# Patient Record
Sex: MALE | Race: WHITE | NOT HISPANIC OR LATINO | Employment: FULL TIME | URBAN - METROPOLITAN AREA
[De-identification: names, ages, dates, MRNs, and addresses within clinical notes are randomized per-mention and may not be internally consistent; named-entity substitution may affect disease eponyms.]

---

## 2019-08-01 ENCOUNTER — ANCILLARY PROCEDURE (OUTPATIENT)
Dept: GENERAL RADIOLOGY | Facility: CLINIC | Age: 59
End: 2019-08-01
Attending: FAMILY MEDICINE
Payer: COMMERCIAL

## 2019-08-01 ENCOUNTER — TELEPHONE (OUTPATIENT)
Dept: FAMILY MEDICINE | Facility: CLINIC | Age: 59
End: 2019-08-01

## 2019-08-01 ENCOUNTER — OFFICE VISIT (OUTPATIENT)
Dept: FAMILY MEDICINE | Facility: CLINIC | Age: 59
End: 2019-08-01
Payer: COMMERCIAL

## 2019-08-01 VITALS
BODY MASS INDEX: 32.62 KG/M2 | OXYGEN SATURATION: 97 % | DIASTOLIC BLOOD PRESSURE: 80 MMHG | SYSTOLIC BLOOD PRESSURE: 125 MMHG | TEMPERATURE: 98.1 F | HEART RATE: 76 BPM | WEIGHT: 203 LBS | HEIGHT: 66 IN

## 2019-08-01 DIAGNOSIS — J90 PLEURAL EFFUSION: Primary | ICD-10-CM

## 2019-08-01 DIAGNOSIS — R10.84 ABDOMINAL PAIN, GENERALIZED: Primary | ICD-10-CM

## 2019-08-01 LAB
BASOPHILS # BLD AUTO: 0 10E9/L (ref 0–0.2)
BASOPHILS NFR BLD AUTO: 0.2 %
DIFFERENTIAL METHOD BLD: NORMAL
EOSINOPHIL # BLD AUTO: 0.3 10E9/L (ref 0–0.7)
EOSINOPHIL NFR BLD AUTO: 2.7 %
ERYTHROCYTE [DISTWIDTH] IN BLOOD BY AUTOMATED COUNT: 14.1 % (ref 10–15)
ERYTHROCYTE [SEDIMENTATION RATE] IN BLOOD BY WESTERGREN METHOD: 9 MM/H (ref 0–20)
HCT VFR BLD AUTO: 45.1 % (ref 40–53)
HGB BLD-MCNC: 14.8 G/DL (ref 13.3–17.7)
LYMPHOCYTES # BLD AUTO: 2.1 10E9/L (ref 0.8–5.3)
LYMPHOCYTES NFR BLD AUTO: 20.6 %
MCH RBC QN AUTO: 28.4 PG (ref 26.5–33)
MCHC RBC AUTO-ENTMCNC: 32.8 G/DL (ref 31.5–36.5)
MCV RBC AUTO: 86 FL (ref 78–100)
MONOCYTES # BLD AUTO: 0.7 10E9/L (ref 0–1.3)
MONOCYTES NFR BLD AUTO: 6.9 %
NEUTROPHILS # BLD AUTO: 7.1 10E9/L (ref 1.6–8.3)
NEUTROPHILS NFR BLD AUTO: 69.6 %
PLATELET # BLD AUTO: 262 10E9/L (ref 150–450)
RBC # BLD AUTO: 5.22 10E12/L (ref 4.4–5.9)
WBC # BLD AUTO: 10.2 10E9/L (ref 4–11)

## 2019-08-01 PROCEDURE — 36415 COLL VENOUS BLD VENIPUNCTURE: CPT | Performed by: FAMILY MEDICINE

## 2019-08-01 PROCEDURE — 74019 RADEX ABDOMEN 2 VIEWS: CPT

## 2019-08-01 PROCEDURE — 99214 OFFICE O/P EST MOD 30 MIN: CPT | Performed by: FAMILY MEDICINE

## 2019-08-01 PROCEDURE — 85652 RBC SED RATE AUTOMATED: CPT | Performed by: FAMILY MEDICINE

## 2019-08-01 PROCEDURE — 85025 COMPLETE CBC W/AUTO DIFF WBC: CPT | Performed by: FAMILY MEDICINE

## 2019-08-01 RX ORDER — LISINOPRIL 5 MG/1
5 TABLET ORAL DAILY
COMMUNITY
Start: 2018-10-18

## 2019-08-01 RX ORDER — ASPIRIN 81 MG/1
81 TABLET, CHEWABLE ORAL DAILY
COMMUNITY
Start: 2018-10-18

## 2019-08-01 RX ORDER — NITROGLYCERIN 0.4 MG/1
0.4 TABLET SUBLINGUAL DAILY
COMMUNITY
Start: 2018-10-17

## 2019-08-01 RX ORDER — METOPROLOL SUCCINATE 25 MG/1
25 TABLET, EXTENDED RELEASE ORAL DAILY
COMMUNITY
Start: 2018-10-18

## 2019-08-01 RX ORDER — ATORVASTATIN CALCIUM 40 MG/1
40 TABLET, FILM COATED ORAL DAILY
COMMUNITY
Start: 2018-10-17

## 2019-08-01 RX ORDER — VARENICLINE TARTRATE 1 MG/1
1 TABLET, FILM COATED ORAL 2 TIMES DAILY
COMMUNITY
Start: 2019-06-26 | End: 2020-08-31

## 2019-08-01 ASSESSMENT — MIFFLIN-ST. JEOR: SCORE: 1688.55

## 2019-08-01 NOTE — PROGRESS NOTES
"Chief complaint: bowel movements    History of lung cancer s/p lung lobectomy   Also history of CAD     A week now has been having issues  Feels like he needs to go to the bathroom - he'll go and pass gas but no stool  Sometimes will have urge to go but then only a small amount will come out  2 days ago patient went home and had good bowel movements about 5 times got some relief although continues to be somewhat gassy.    Usually very regular no bowel issues  Last bowel movement was this morning and just a couple of christa had a little slime  Fever and chills:No  Abdominal pain: some cramping when he needs to go   Nausea and vomiting: No   Diarrhea: NO   Recent antibiotic use:No  Recent travel:No   No change in diet   Known ill contacts or exposure to c.diff:No - had recent hospitalization for chest pain   Eating any raw or undercooked foods:No  Lightheadedness, syncope, or presyncope:No    No chest pain or shortness of breath or neck stiffness    Problem list and histories reviewed & adjusted, as indicated.  Additional history: as documented    Problem list, Medication list, Allergies, and Medical/Social/Surgical histories reviewed in [facility] and updated as appropriate.    ROS:  Constitutional, HEENT, cardiovascular, pulmonary, gi and gu systems are negative, except as otherwise noted.    OBJECTIVE:                                                    /80   Pulse 76   Temp 98.1  F (36.7  C) (Oral)   Ht 1.676 m (5' 6\")   Wt 92.1 kg (203 lb)   SpO2 97%   BMI 32.77 kg/m    Body mass index is 32.77 kg/m .  Orthostatic: No  GENERAL: healthy, alert and no distress  Eyes: anicteric   NECK: no adenopathy, no asymmetry, masses, or scars and thyroid normal to palpation  RESP: lungs clear to auscultation - no rales, rhonchi or wheezes  CV: regular rate and rhythm, normal S1 S2, no S3 or S4, no murmur, click or rub, no peripheral edema and peripheral pulses strong  ABDOMEN: soft, mild bilateral lower abdominal " tenderness,  no hepatosplenomegaly, no masses and bowel sounds normal  MS: no gross musculoskeletal defects noted, no edema  Skin: well hydrated. No rashes. Good capillary refill and skin turgor.     Diagnostic Test Results:  Diagnostic Test Results:  Results for orders placed or performed in visit on 08/01/19 (from the past 24 hour(s))   CBC with platelets differential   Result Value Ref Range    WBC 10.2 4.0 - 11.0 10e9/L    RBC Count 5.22 4.4 - 5.9 10e12/L    Hemoglobin 14.8 13.3 - 17.7 g/dL    Hematocrit 45.1 40.0 - 53.0 %    MCV 86 78 - 100 fl    MCH 28.4 26.5 - 33.0 pg    MCHC 32.8 31.5 - 36.5 g/dL    RDW 14.1 10.0 - 15.0 %    Platelet Count 262 150 - 450 10e9/L    % Neutrophils 69.6 %    % Lymphocytes 20.6 %    % Monocytes 6.9 %    % Eosinophils 2.7 %    % Basophils 0.2 %    Absolute Neutrophil 7.1 1.6 - 8.3 10e9/L    Absolute Lymphocytes 2.1 0.8 - 5.3 10e9/L    Absolute Monocytes 0.7 0.0 - 1.3 10e9/L    Absolute Eosinophils 0.3 0.0 - 0.7 10e9/L    Absolute Basophils 0.0 0.0 - 0.2 10e9/L    Diff Method Automated Method    Erythrocyte sedimentation rate auto   Result Value Ref Range    Sed Rate 9 0 - 20 mm/h   XR Abdomen 2 Views    Narrative    ABDOMEN TWO VIEWS 8/1/2019 1:34 PM     HISTORY: Abdominal pain, generalized    COMPARISON: None      Impression    IMPRESSION: Opacity at the lateral right lung base consistent with  small effusion. Old right rib fractures. Nonobstructive bowel gas  pattern.    ELISA NASCIMENTO MD          ASSESSMENT/PLAN:                                                        ICD-10-CM    1. Abdominal pain, generalized R10.84 CBC with platelets differential     Erythrocyte sedimentation rate auto     XR Abdomen 2 Views     Cbc esr normal  History consistent with constipation  Xray to my review moderate stool burden  I offered CT abdominal pelvis to rule out diverticulitis/appendicits (clinical suspicion low though) patient however declined.  He is quite comfortable without pain  currently  HE would like to try miralax and he plans to go to ER if symptoms worsen or change, and follow up with primary care provider if symptoms persist    Later on xray read by radiology showed pleural effusion right lung - see telephone note.  Patient has no symptoms  Advised to follow up with pulmonology and discuss with his pulmonologist    Jena Koroma MD  [facility]

## 2019-08-01 NOTE — TELEPHONE ENCOUNTER
"I spoke to [facility] Radiologist Noé and he reviewed both xray images.  He states that there is a \"new\" plural effusion in the abdominal xray compared to the chest xray from [facility].      Dyana Hooper,       "

## 2019-08-01 NOTE — PATIENT INSTRUCTIONS
Patient Education     Constipation (Adult)  Constipation means that you have bowel movements that are less frequent than usual. Stools often become very hard and difficult to pass.  Constipation is very common. At some point in life, it affects almost everyone. Since everyone's bowel habits are different, what is constipation to one person may not be to another. Your healthcare provider may do tests to diagnose constipation. It depends on what he or she finds when evaluating you.    Symptoms of constipation include:    Abdominal pain    Bloating    Vomiting    Painful bowel movements    Itching, swelling, bleeding, or pain around the anus  Causes  Constipation can have many causes. These include:    Diet low in fiber    Too much dairy    Not drinking enough liquids    Lack of exercise or physical activity (especially true for older adults)    Changes in lifestyle or daily routine, including pregnancy, aging, work, and travel    Frequent use or misuse of laxatives    Ignoring the urge to have a bowel movement or delaying it until later    Medicines, such as certain prescription pain medicines, iron supplements, antacids, certain antidepressants, and calcium supplements    Diseases like irritable bowel syndrome, bowel obstructions, stroke, diabetes, thyroid disease, Parkinson disease, hemorrhoids, and colon cancer  Complications  Potential complications of constipation can include:    Hemorrhoids    Rectal bleeding from hemorrhoids or anal fissures (skin tears)    Hernias    Dependency on laxatives    Chronic constipation    Fecal impaction, a severe form of constipation in which a large amount of hard stool is in your rectum that you can't pass    Bowel obstruction or perforation  Home care  All treatment should be done after talking with your healthcare provider. This is especially true if you have another medical problems, are taking prescription medicines, or are an older adult. Treatment most often involves  lifestyle changes. You may also need medicines. Your healthcare provider will tell you which will work best for you. Follow the advice below to help avoid this problem in the future.  Lifestyle changes  These lifestyle changes can help prevent constipation:    Diet. Eat a high-fiber diet, with fresh fruit and vegetables, and reduce dairy intake, meats, and processed foods    Fluids. It's important to get enough fluids each day. Drink plenty of water when you eat more fiber. If you are on diet that limits the amount of fluid you can have, talk about this with your healthcare provider.    Regular exercise. Check with your healthcare provider first.  Medicines  Take any medicines as directed. Some laxatives are safe to use only every now and then. Others can be taken on a regular basis. While laxatives don't cause bowel dependence, they are treating the symptoms. So your constipation may return if you don't make other changes. Talk with your healthcare provider or pharmacist if you have questions.  Prescription pain medicines can cause constipation. If you are taking this kind of medicine, ask your healthcare provider if you should also take a stool softener.  Medicines you may take to treat constipation include:    Fiber supplements    Stool softeners    Laxatives    Enemas    Rectal suppositories  Follow-up care  Follow up with your healthcare provider if symptoms don't get better in the next few days. You may need to have more tests or see a specialist.  Call 911  Call 911 if any of these occur:    Trouble breathing    Stiff, rigid abdomen that is severely painful to touch    Confusion    Fainting or loss of consciousness    Rapid heart rate    Chest pain  When to seek medical advice  Call your healthcare provider right away if any of these occur:    Fever of 100.4 F (38 C) or higher, or as directed by your healthcare provider    Failure to resume normal bowel movements    Pain in your abdomen or back gets  worse    Nausea or vomiting    Swelling in your abdomen    Blood in the stool    Black, tarry stool    Involuntary weight loss    Weakness  Date Last Reviewed: 6/1/2018 2000-2018 [employer]. [address] [place] [address], [place] [address]. All rights reserved. This information is not intended as a substitute for professional medical care. Always follow your healthcare professional's instructions.

## 2019-08-01 NOTE — TELEPHONE ENCOUNTER
Request help.   PATIENT has an abnormal reading on chest xray done today of possible small effusion on right lung.   PATIENT has had lung cancer s/p lobectomy on right lung.  Can we have his xray from [facility] (his most recent chest xray) reviewed by the radiologist and compare to our current xray and see if there are any changes or if they are similar.  Thank you    Jena Koroma M.D.

## 2019-08-02 NOTE — TELEPHONE ENCOUNTER
Yes, okay to do referral to pulmonology for fluid in the lungs.  Referral placed.  They may need to have this drained and biopsy.  Sounds like he has had lungs issues in the past.  Otherwise, to the ER if he develops chest pain, shortness of breath, hemoptysis, leg swelling.      Myriam See CARMEL García

## 2019-08-02 NOTE — TELEPHONE ENCOUNTER
Patient was given the information below.  He was given the number for the pulmonology clinics and will schedule.  He was given the symptoms from Dia See to go to the E.R.  He will reach out to us if he needs any further support.   Patient verbalizes good understanding, agrees with plan and states he needs no further support. Ynes Davis R.N.

## 2019-08-02 NOTE — TELEPHONE ENCOUNTER
Left message on answering machine for patient to call back to [phone].  Nai Frederick BSN, RN

## 2019-08-02 NOTE — TELEPHONE ENCOUNTER
Pt notified of provider message as written.  He currently does not have a pulmonologist.  He can't see his previous one at [facility] because he did not know he did not have insurance and had procedures there and owe them a lot of money.      Should we do a pulmonology referral?  He probably can't get in with one for a few months? [place] [facility] may have shorter time frame.    Pt asking what this means?  Does he need to have it drained?  Nai Frederick BSN, RN

## 2019-08-02 NOTE — TELEPHONE ENCOUNTER
Left message on answering machine for patient/parent to call back.   [phone].  Cheli Zavala RN

## 2019-08-07 ENCOUNTER — PATIENT OUTREACH (OUTPATIENT)
Dept: PULMONOLOGY | Facility: CLINIC | Age: 59
End: 2019-08-07

## 2019-08-07 DIAGNOSIS — J90 PLEURAL EFFUSION: Primary | ICD-10-CM

## 2019-08-07 NOTE — PROGRESS NOTES
Dr. Rodriguez would like patient to get a Chest CT without contrast prior to his visit on 8/12/19. Patient scheduled for a CT scan on Friday at 10:10.     Nick Murray RN   Pulmonary/CORE Care Coordinator  [facility]

## 2019-08-09 ENCOUNTER — ANCILLARY PROCEDURE (OUTPATIENT)
Dept: CT IMAGING | Facility: CLINIC | Age: 59
End: 2019-08-09
Attending: INTERNAL MEDICINE
Payer: COMMERCIAL

## 2019-08-09 DIAGNOSIS — J90 PLEURAL EFFUSION: ICD-10-CM

## 2019-08-09 PROCEDURE — 71250 CT THORAX DX C-: CPT | Performed by: RADIOLOGY

## 2019-08-12 ENCOUNTER — TELEPHONE (OUTPATIENT)
Dept: PULMONOLOGY | Facility: CLINIC | Age: 59
End: 2019-08-12

## 2019-08-12 ENCOUNTER — OFFICE VISIT (OUTPATIENT)
Dept: PULMONOLOGY | Facility: CLINIC | Age: 59
End: 2019-08-12
Attending: PHYSICIAN ASSISTANT
Payer: COMMERCIAL

## 2019-08-12 VITALS
HEART RATE: 71 BPM | OXYGEN SATURATION: 97 % | BODY MASS INDEX: 33.25 KG/M2 | WEIGHT: 206 LBS | DIASTOLIC BLOOD PRESSURE: 79 MMHG | RESPIRATION RATE: 22 BRPM | SYSTOLIC BLOOD PRESSURE: 142 MMHG

## 2019-08-12 DIAGNOSIS — R91.8 PULMONARY NODULES: Primary | ICD-10-CM

## 2019-08-12 DIAGNOSIS — J44.9 CHRONIC OBSTRUCTIVE PULMONARY DISEASE, UNSPECIFIED COPD TYPE (H): ICD-10-CM

## 2019-08-12 PROCEDURE — 99204 OFFICE O/P NEW MOD 45 MIN: CPT | Mod: 25 | Performed by: INTERNAL MEDICINE

## 2019-08-12 RX ORDER — TIOTROPIUM BROMIDE 18 UG/1
18 CAPSULE ORAL; RESPIRATORY (INHALATION) DAILY
Qty: 1 CAPSULE | Refills: 11 | Status: SHIPPED | OUTPATIENT
Start: 2019-08-12 | End: 2019-12-16

## 2019-08-12 RX ORDER — CLOPIDOGREL BISULFATE 75 MG/1
75 TABLET ORAL DAILY
COMMUNITY

## 2019-08-12 RX ORDER — ALBUTEROL SULFATE 90 UG/1
2 AEROSOL, METERED RESPIRATORY (INHALATION) EVERY 4 HOURS PRN
Qty: 1 INHALER | Refills: 3 | Status: SHIPPED | OUTPATIENT
Start: 2019-08-12 | End: 2019-11-11

## 2019-08-12 ASSESSMENT — PAIN SCALES - GENERAL: PAINLEVEL: NO PAIN (0)

## 2019-08-12 NOTE — NURSING NOTE
Jerry Wren's goals for this visit include:   Chief Complaint   Patient presents with     Consult     Pleural Effusion       He requests these members of his care team be copied on today's visit information: PCP    PCP: Jena Koroma    Referring Provider:  Myriam García PA-C  [facility]  [address] [place] [address]    BP (!) 142/79 (BP Location: Left arm, Patient Position: Sitting, Cuff Size: Adult Regular)   Pulse 71   Resp 22   Wt 93.4 kg (206 lb)   SpO2 97%   BMI 33.25 kg/m      Do you need any medication refills at today's visit? No    Sveta Billings LPN

## 2019-08-12 NOTE — PROGRESS NOTES
LUNG NODULE & INTERVENTIONAL PULMONARY CLINIC  [facility], [facility], [facility]     Jerry Wren MRN# [id number]   Age: 59 year old YOB: 1960     Reason for Consultation: lung nodule(s)    Requesting Physician: Myriam García PA-C  [facility]  [address] [place] [address]       Assessment and Plan:    1. New solitary pulmonary lung nodule(s). Given the characteristics on current/previous imaging and risk factors; I would classify this to be Intermediate (6-65%) risk for cancer. 6mm MIGUELINA ggo on CT reported 8/9/19. He has had previous CT's at [facility] which did not report this nodule. We will get old CT's pushed into our system however will pursue surveillance of this ggo. Next CT in 3mo     2. COPD. Never had PNA or flu vaccine. Will give Prevnar-13 today. Mild obstruction 3yrs ago from PFT in [facility]. Will prescribe prn albuterol and spiriva. Will repeat PFT on next visit.     3. Smoking.on chantix and quit date is end of the month.     4. NSCLC s/p RUL lobectomy. Will need close surveillance for next 2-3yrs then annual CT. Small right pleural effusion not concerning especially after surgery. Will follow, if increasing in size then will pursue thoracentesis.     Billing: The patient was seen and examined by me and the findings, assessment, and plan as documented was explained to the patient/family who expressed understand.     Norberto Rodriguez MD   of Medicine  Interventional Pulmonology  Department of Pulmonary, Allergy, Critical Care and Sleep Medicine   [facility]  Pager: [phone]          History:     Jerry Wren is a 59 year old male with sig h/o for NSCLC and CAD  who is here for evaluation/followup of lung nodule(s).    - No new resp sx or complaints. Denies dyspnea or cough.   - Here for f/u nodules and pleural effusion   - Personal hx of cancer: NSCLC. Up-to-date on  c-scope    - Family hx of cancer: Mom  of lung cancer.   - Exposure hx: Denies asbestos or radon exposure   - Tobacco hx: Current Smoker: 2.5ppd since 14yo and down to 1ppd with chantix. Quit date at the end of this month   - My interpretation of the images relevant for this visit includes: nodules, pleural effusion   - My interpretation of the PFT's relevant for this visit includes: Mild obstruction (3/26/15) at [facility]     Culprit Nodule(s):   1: Left upper lobe nodule and is 6 mm in size/severity and ground glass in morphology/quality. First seen by chest CT on 19. First observed on this date .    Other active medical problems include:   - had NSCLC (Stage 1) s/p RUL lobectomy 18   - had CAD s/p PCI x1 in 2018. On plavix. Stable.            Past Medical History:   NSCLC, CAD          Past Surgical History:    RUL Lobectomy 2018         Social History:     Social History     Tobacco Use     Smoking status: Current Every Day Smoker     Smokeless tobacco: Never Used   Substance Use Topics     Alcohol use: Not on file          Family History:     Family History   Problem Relation Age of Onset     Cancer Mother      Heart Disease Father            Allergies:      Allergies   Allergen Reactions     Bee Venom Anaphylaxis     BEE STINGS---told to carry epipen  Red ants stings     Penicillins Anaphylaxis and Shortness Of Breath     Oxycodone Other (See Comments) and Unknown     Mouth got blisters and bumps  Mouth got blisters and bumps            Medications:     Current Outpatient Medications   Medication Sig     aspirin (ASA) 81 MG chewable tablet 81 mg by Nasogastric route daily     atorvastatin (LIPITOR) 40 MG tablet Take 40 mg by mouth daily     clopidogrel (PLAVIX) 75 MG tablet Take 75 mg by mouth daily     lisinopril (PRINIVIL/ZESTRIL) 5 MG tablet Take 5 mg by mouth daily     metoprolol succinate ER (TOPROL-XL) 25 MG 24 hr tablet Take 25 mg by mouth daily     nitroGLYcerin (NITROSTAT) 0.4 MG sublingual  tablet Place 0.4 mg under the tongue daily     ranitidine (RANITIDINE) 75 MG tablet Take 150 mg by mouth daily     varenicline (CHANTIX CONTINUING MONTH PAK) 1 MG tablet Take 1 mg by mouth 2 times daily     No current facility-administered medications for this visit.           Review of Systems:     CONSTITUTIONAL: negative for fever, chills, change in weight  INTEGUMENTARY/SKIN: no rash or obvious new lesions  ENT/MOUTH: no sore throat, new sinus pain or nasal drainage  RESP: see interval history  CV: negative for chest pain, palpitations or peripheral edema  GI: no nausea, vomiting, change in stools  : no dysuria  MUSCULOSKELETAL: no myalgias, arthralgias  ENDOCRINE: blood sugars with adequate control  PSYCHIATRIC: mood stable  LYMPHATIC: no new lymphadenopathy  HEME: no bleeding or easy bruisability  NEURO: no numbness, weakness, headaches         Physical Exam:     Pulse:  [71] 71  Resp:  [22] 22  BP: (142)/(79) 142/79  SpO2:  [97 %] 97 %  Wt Readings from Last 4 Encounters:   08/12/19 93.4 kg (206 lb)   08/01/19 92.1 kg (203 lb)     Constitutional:   Awake, alert and in no apparent distress     Eyes:   Nonicteric, RITESH     ENT:    Trachea is midline. No gross neck abnormalities      Neck:   Supple without supraclavicular or cervical lymphadenopathy     Lungs:   Good air flow.  No crackles. No rhonchi.  No wheezes.     Cardiovascular:   Normal S1 and S2.  RRR.  No murmur, gallop or rub.  Radial, DP and PT pulses normal and symmetric     Abdomen:   NABS, soft, nontender, nondistended.  No HSM.     Musculoskeletal:   No edema.      Neurologic:   Alert and conversant. Cranial nerves  intact.       Skin:   Warm, dry.  No rash on limited exam.           Current Laboratory Data:   All laboratory and imaging data reviewed.           Previous Cardiology Imaging   No results found for this or any previous visit (from the past 8760 hour(s)).

## 2019-08-12 NOTE — TELEPHONE ENCOUNTER
ARMIDA to get Chest CT PE study from 6/17/18 pushed to [facility] PACS from [facility].     Nick Murray RN   Pulmonary/CORE Care Coordinator  [facility]

## 2019-08-13 NOTE — TELEPHONE ENCOUNTER
Patient's CT images have been received.     Nick Murray RN   Pulmonary/CORE Care Coordinator  [facility]

## 2019-09-27 ENCOUNTER — HEALTH MAINTENANCE LETTER (OUTPATIENT)
Age: 59
End: 2019-09-27

## 2019-11-11 ENCOUNTER — OFFICE VISIT (OUTPATIENT)
Dept: PULMONOLOGY | Facility: CLINIC | Age: 59
End: 2019-11-11
Payer: COMMERCIAL

## 2019-11-11 ENCOUNTER — OFFICE VISIT (OUTPATIENT)
Dept: NURSING | Facility: CLINIC | Age: 59
End: 2019-11-11
Payer: COMMERCIAL

## 2019-11-11 ENCOUNTER — ANCILLARY PROCEDURE (OUTPATIENT)
Dept: CT IMAGING | Facility: CLINIC | Age: 59
End: 2019-11-11
Attending: INTERNAL MEDICINE
Payer: COMMERCIAL

## 2019-11-11 VITALS
WEIGHT: 201.6 LBS | BODY MASS INDEX: 33.59 KG/M2 | HEIGHT: 65 IN | OXYGEN SATURATION: 97 % | DIASTOLIC BLOOD PRESSURE: 77 MMHG | SYSTOLIC BLOOD PRESSURE: 134 MMHG | RESPIRATION RATE: 18 BRPM | HEART RATE: 67 BPM

## 2019-11-11 DIAGNOSIS — J44.9 CHRONIC OBSTRUCTIVE PULMONARY DISEASE, UNSPECIFIED COPD TYPE (H): ICD-10-CM

## 2019-11-11 DIAGNOSIS — Z23 NEED FOR PROPHYLACTIC VACCINATION AND INOCULATION AGAINST INFLUENZA: ICD-10-CM

## 2019-11-11 DIAGNOSIS — R91.8 PULMONARY NODULES: ICD-10-CM

## 2019-11-11 DIAGNOSIS — R91.8 PULMONARY NODULES: Primary | ICD-10-CM

## 2019-11-11 PROCEDURE — 94726 PLETHYSMOGRAPHY LUNG VOLUMES: CPT | Performed by: INTERNAL MEDICINE

## 2019-11-11 PROCEDURE — 90670 PCV13 VACCINE IM: CPT | Performed by: INTERNAL MEDICINE

## 2019-11-11 PROCEDURE — 90682 RIV4 VACC RECOMBINANT DNA IM: CPT | Performed by: INTERNAL MEDICINE

## 2019-11-11 PROCEDURE — 2894A HC MAXIMUM VOLUNTARY VENTILATION: CPT | Performed by: INTERNAL MEDICINE

## 2019-11-11 PROCEDURE — 90472 IMMUNIZATION ADMIN EACH ADD: CPT | Performed by: INTERNAL MEDICINE

## 2019-11-11 PROCEDURE — 94060 EVALUATION OF WHEEZING: CPT | Performed by: INTERNAL MEDICINE

## 2019-11-11 PROCEDURE — 94729 DIFFUSING CAPACITY: CPT | Performed by: INTERNAL MEDICINE

## 2019-11-11 PROCEDURE — 90471 IMMUNIZATION ADMIN: CPT | Performed by: INTERNAL MEDICINE

## 2019-11-11 PROCEDURE — 99214 OFFICE O/P EST MOD 30 MIN: CPT | Mod: 25 | Performed by: INTERNAL MEDICINE

## 2019-11-11 PROCEDURE — 71250 CT THORAX DX C-: CPT | Performed by: RADIOLOGY

## 2019-11-11 RX ORDER — ALBUTEROL SULFATE 90 UG/1
2 AEROSOL, METERED RESPIRATORY (INHALATION) EVERY 4 HOURS PRN
Qty: 1 INHALER | Refills: 3 | Status: SHIPPED | OUTPATIENT
Start: 2019-11-11 | End: 2020-08-03

## 2019-11-11 ASSESSMENT — PAIN SCALES - GENERAL: PAINLEVEL: NO PAIN (0)

## 2019-11-11 ASSESSMENT — MIFFLIN-ST. JEOR: SCORE: 1670.29

## 2019-11-11 NOTE — PATIENT INSTRUCTIONS
If you have had any blood work, imaging or other testing completed we will be in touch within 1-2 weeks regarding the results.     If you need refills please contact your pharmacy.     It was a pleasure meeting with you today. Please let us know if there is anything else we can do for you so that we can be sure you are leaving completely satisfied with your care experience.     If you have any questions, concerns or need to schedule a follow up, please contact us at [phone] and our fax [phone].    Your Pulmonology Team at [facility]

## 2019-11-11 NOTE — NURSING NOTE
"Jerry Wren's goals for this visit include: Return  He requests these members of his care team be copied on today's visit information: PCP    PCP: Jena Koroma    Referring Provider:  No referring provider defined for this encounter.    /77 (BP Location: Left arm, Patient Position: Sitting, Cuff Size: Adult Regular)   Pulse 67   Resp 18   Ht 1.657 m (5' 5.25\")   Wt 91.4 kg (201 lb 9.6 oz)   SpO2 97%   BMI 33.29 kg/m      Do you need any medication refills at today's visit? MORIS - Ventolin    "

## 2019-11-11 NOTE — PROGRESS NOTES
LUNG NODULE & INTERVENTIONAL PULMONARY CLINIC  CLINICS & SURGERY CENTER, [facility]     Jerry Wren MRN# [id number]   Age: 59 year old YOB: 1960     Reason for Consultation: lung nodule(s)       Assessment and Plan:    1. Established multiple pulmonary lung nodule(s). Previously seen ggo in [facility] is resolved on my view today. Will await official report.      2. COPD. Will get PNA and flu vaccine today. PFT today with mild obstruction although there is sig improvement with BD's. Will prescribe incruse given insurance issue.      3. Smoking. Down to 4cig/d and quitting this wk with chantix.      4. NSCLC s/p RUL lobectomy. No acute findings on today's CT. Will need close surveillance for next 2-3yrs then annual CT. Next CT in 6mo.     Billing: I spent more than 30 minutes face to face and greater than 50% of time was for counseling and coordination of care about the issues above.    Norberto Rodriguez MD   of Medicine  Interventional Pulmonology  Department of Pulmonary, Allergy, Critical Care and Sleep Medicine   [facility]  Pager: [phone]          History:     Jerry Wren is a 59 year old male with sig h/o for NSCLC and CAD  who is here for evaluation/followup of lung nodule(s).     - No new resp sx or complaints. Denies dyspnea or cough.   - Here for f/u nodules and pleural effusion   - Personal hx of cancer: NSCLC. Up-to-date on c-scope    - Family hx of cancer: Mom  of lung cancer.   - Exposure hx: Denies asbestos or radon exposure   - Tobacco hx: Current Smoker: 2.5ppd since 14yo and down to 1ppd with chantix. Quit date at the end of this month   - My interpretation of the images relevant for this visit includes: nodules, pleural effusion   - My interpretation of the PFT's relevant for this visit includes: Mild obstruction (3/26/15) at [facility]      Culprit Nodule(s):   1: Left lower lobe  nodule and is 6 mm in size/severity and ground glass in morphology/quality. First seen by chest CT on 8/9/19. First observed on this date. Not visualized on today's CT  2. Pleural effusion is resolved     Other active medical problems include:   - had NSCLC (Stage 1) s/p RUL lobectomy 6/7/18   - had CAD s/p PCI x1 in 2018. On plavix. Stable.            Past Medical History:   NSCLC, CAD          Past Surgical History:    No past surgical history on file.          Social History:     Social History     Tobacco Use     Smoking status: Current Every Day Smoker     Smokeless tobacco: Never Used   Substance Use Topics     Alcohol use: Not on file          Family History:     Family History   Problem Relation Age of Onset     Cancer Mother      Heart Disease Father            Allergies:      Allergies   Allergen Reactions     Bee Venom Anaphylaxis     BEE STINGS---told to carry epipen  Red ants stings     Penicillins Anaphylaxis and Shortness Of Breath     Oxycodone Other (See Comments) and Unknown     Mouth got blisters and bumps  Mouth got blisters and bumps            Medications:     Current Outpatient Medications   Medication Sig     albuterol (PROAIR HFA/PROVENTIL HFA/VENTOLIN HFA) 108 (90 Base) MCG/ACT inhaler Inhale 2 puffs into the lungs every 4 hours as needed for shortness of breath / dyspnea or wheezing     aspirin (ASA) 81 MG chewable tablet 81 mg by Nasogastric route daily     atorvastatin (LIPITOR) 40 MG tablet Take 40 mg by mouth daily     clopidogrel (PLAVIX) 75 MG tablet Take 75 mg by mouth daily     lisinopril (PRINIVIL/ZESTRIL) 5 MG tablet Take 5 mg by mouth daily     metoprolol succinate ER (TOPROL-XL) 25 MG 24 hr tablet Take 25 mg by mouth daily     nitroGLYcerin (NITROSTAT) 0.4 MG sublingual tablet Place 0.4 mg under the tongue daily     ranitidine (RANITIDINE) 75 MG tablet Take 150 mg by mouth daily     varenicline (CHANTIX CONTINUING MONTH HERMELINDA) 1 MG tablet Take 1 mg by mouth 2 times daily      tiotropium (SPIRIVA) 18 MCG inhaled capsule Inhale 1 capsule (18 mcg) into the lungs daily (Patient not taking: Reported on 11/11/2019)     No current facility-administered medications for this visit.           Review of Systems:     CONSTITUTIONAL: negative for fever, chills, change in weight  INTEGUMENTARY/SKIN: no rash or obvious new lesions  ENT/MOUTH: no sore throat, new sinus pain or nasal drainage  RESP: see interval history  CV: negative for chest pain, palpitations or peripheral edema  GI: no nausea, vomiting, change in stools  : no dysuria  MUSCULOSKELETAL: no myalgias, arthralgias  ENDOCRINE: blood sugars with adequate control  PSYCHIATRIC: mood stable  LYMPHATIC: no new lymphadenopathy  HEME: no bleeding or easy bruisability  NEURO: no numbness, weakness, headaches         Physical Exam:     Pulse:  [67] 67  Resp:  [18] 18  BP: (134)/(77) 134/77  SpO2:  [97 %] 97 %  Wt Readings from Last 4 Encounters:   11/11/19 91.4 kg (201 lb 9.6 oz)   08/12/19 93.4 kg (206 lb)   08/01/19 92.1 kg (203 lb)     Constitutional:   Awake, alert and in no apparent distress     Eyes:   Nonicteric, RITESH     ENT:    Trachea is midline. No gross neck abnormalities      Neck:   Supple without supraclavicular or cervical lymphadenopathy     Lungs:   Good air flow.  No crackles. No rhonchi.  No wheezes.     Cardiovascular:   Normal S1 and S2.  RRR.  No murmur, gallop or rub.  Radial, DP and PT pulses normal and symmetric     Abdomen:   NABS, soft, nontender, nondistended.  No HSM.     Musculoskeletal:   No edema.      Neurologic:   Alert and conversant. Cranial nerves  intact.       Skin:   Warm, dry.  No rash on limited exam.           Current Laboratory Data:   All laboratory and imaging data reviewed.    Results for orders placed or performed in visit on 11/11/19 (from the past 24 hour(s))   General PFT Lab (Please always keep checked)   Result Value Ref Range    FVC-Pred 4.01 L    FVC-Pre 3.85 L    FVC-%Pred-Pre 95 %     FEV1-Pre 2.44 L    FEV1-%Pred-Pre 77 %    FEV1FVC-Pred 79 %    FEV1FVC-Pre 64 %    FEFMax-Pred 8.40 L/sec    FEFMax-Pre 5.47 L/sec    FEFMax-%Pred-Pre 65 %    FEF2575-Pred 2.81 L/sec    FEF2575-Pre 1.22 L/sec    RYK1094-%Pred-Pre 43 %    FEF2575-Post 1.91 L/sec    UEB0618-%Pred-Post 67 %    ExpTime-Pre 9.73 sec    FIFMax-Pre 3.00 L/sec    MVV-Pred 128 L/min    MVV-Pre 104 L/min    MVV-%Pred-Pre 81 %    VC-Pred 4.23 L    VC-Pre 4.08 L    VC-%Pred-Pre 96 %    IC-Pred 3.60 L    IC-Pre 3.14 L    IC-%Pred-Pre 87 %    ERV-Pred 0.63 L    ERV-Pre 0.94 L    ERV-%Pred-Pre 150 %    FEV1FEV6-Pred 79 %    FEV1FEV6-Pre 67 %    FRCPleth-Pred 3.30 L    FRCPleth-Pre 3.72 L    FRCPleth-%Pred-Pre 112 %    RVPleth-Pred 2.20 L    RVPleth-Pre 2.78 L    RVPleth-%Pred-Pre 126 %    TLCPleth-Pred 6.16 L    TLCPleth-Pre 6.86 L    TLCPleth-%Pred-Pre 111 %    DLCOunc-Pred 24.21 ml/min/mmHg    DLCOunc-Pre 20.93 ml/min/mmHg    DLCOunc-%Pred-Pre 86 %    VA-Pre 6.00 L    VA-%Pred-Pre 108 %    FEV1SVC-Pred 75 %    FEV1SVC-Pre 60 %            Previous Pulmonary Function Testing     FVC-Pred   Date Value Ref Range Status   11/11/2019 4.01 L      FVC-Pre   Date Value Ref Range Status   11/11/2019 3.85 L      FVC-%Pred-Pre   Date Value Ref Range Status   11/11/2019 95 %      FEV1-Pre   Date Value Ref Range Status   11/11/2019 2.44 L      FEV1-%Pred-Pre   Date Value Ref Range Status   11/11/2019 77 %      FEV1FVC-Pred   Date Value Ref Range Status   11/11/2019 79 %      FEV1FVC-Pre   Date Value Ref Range Status   11/11/2019 64 %      No results found for: 20029  FEFMax-Pred   Date Value Ref Range Status   11/11/2019 8.40 L/sec      FEFMax-Pre   Date Value Ref Range Status   11/11/2019 5.47 L/sec      FEFMax-%Pred-Pre   Date Value Ref Range Status   11/11/2019 65 %      ExpTime-Pre   Date Value Ref Range Status   11/11/2019 9.73 sec      FIFMax-Pre   Date Value Ref Range Status   11/11/2019 3.00 L/sec      FEV1FEV6-Pred   Date Value Ref Range Status    11/11/2019 79 %      FEV1FEV6-Pre   Date Value Ref Range Status   11/11/2019 67 %             Previous Cardiology Imaging   No results found for this or any previous visit (from the past 8760 hour(s)).

## 2019-11-11 NOTE — LETTER
2019        RE: Jerry Wren   [address]  [place] [address]        LUNG NODULE & INTERVENTIONAL PULMONARY CLINIC  CLINICS & SURGERY CENTER, [facility], [facility]     Jerry Wren MRN# [id number]   Age: 59 year old YOB: 1960     Reason for Consultation: lung nodule(s)       Assessment and Plan:    1.  Established multiple pulmonary lung nodule(s). Previously seen ggo in [facility] is resolved on my view today. Will await official report.      2. COPD.  Will get PNA and flu vaccine today. PFT today with mild obstruction although there is sig improvement with BD's. Will prescribe incruse given insurance issue.      3. Smoking. Down to 4cig/d and quitting this wk with chantix.      4. NSCLC s/p RUL lobectomy. No acute findings on today's CT. Will need close surveillance for next 2-3yrs then annual CT. Next CT in 6mo.     Billing: I spent more than 30 minutes face to face and greater than 50% of time was for counseling and coordination of care about the issues above.    Norberto Rodriguez MD   of Medicine  Interventional Pulmonology  Department of Pulmonary, Allergy, Critical Care and Sleep Medicine   [facility]  Pager: [phone]          History:     Jerry Wren is a 59 year old male with sig h/o for NSCLC and CAD  who is here for evaluation/followup of lung nodule(s).     - No new resp sx or complaints. Denies dyspnea or cough.   - Here for f/u nodules and pleural effusion   - Personal hx of cancer: NSCLC. Up-to-date on c-scope    - Family hx of cancer: Mom  of lung cancer.   - Exposure hx: Denies asbestos or radon exposure   - Tobacco hx: Current Smoker: 2.5ppd since 14yo and down to 1ppd with chantix. Quit date at the end of this month   - My interpretation of the images relevant for this visit includes: nodules, pleural effusion   - My interpretation of the PFT's relevant for this visit  includes: Mild obstruction (3/26/15) at [facility]      Culprit Nodule(s):   1: Left  lower lobe nodule and is 6 mm in size/severity and ground glass in morphology/quality. First seen by chest CT on 8/9/19. First observed on this date. Not visualized on today's CT  2. Pleural effusion is resolved     Other active medical problems include:   - had NSCLC (Stage 1) s/p RUL lobectomy 6/7/18   - had CAD s/p PCI x1 in 2018. On plavix. Stable.            Past Medical History:   NSCLC, CAD          Past Surgical History:    No past surgical history on file.          Social History:     Social History     Tobacco Use     Smoking status: Current Every Day Smoker     Smokeless tobacco: Never Used   Substance Use Topics     Alcohol use: Not on file          Family History:     Family History   Problem Relation Age of Onset     Cancer Mother      Heart Disease Father            Allergies:      Allergies   Allergen Reactions     Bee Venom Anaphylaxis     BEE STINGS---told to carry epipen  Red ants stings     Penicillins Anaphylaxis and Shortness Of Breath     Oxycodone Other (See Comments) and Unknown     Mouth got blisters and bumps  Mouth got blisters and bumps            Medications:     Current Outpatient Medications   Medication Sig     albuterol (PROAIR HFA/PROVENTIL HFA/VENTOLIN HFA) 108 (90 Base) MCG/ACT inhaler Inhale 2 puffs into the lungs every 4 hours as needed for shortness of breath / dyspnea or wheezing     aspirin (ASA) 81 MG chewable tablet 81 mg by Nasogastric route daily     atorvastatin (LIPITOR) 40 MG tablet Take 40 mg by mouth daily     clopidogrel (PLAVIX) 75 MG tablet Take 75 mg by mouth daily     lisinopril (PRINIVIL/ZESTRIL) 5 MG tablet Take 5 mg by mouth daily     metoprolol succinate ER (TOPROL-XL) 25 MG 24 hr tablet Take 25 mg by mouth daily     nitroGLYcerin (NITROSTAT) 0.4 MG sublingual tablet Place 0.4 mg under the tongue daily     ranitidine (RANITIDINE) 75 MG tablet Take 150 mg by mouth daily      varenicline (CHANTIX CONTINUING MONTH HERMELINDA) 1 MG tablet Take 1 mg by mouth 2 times daily     tiotropium (SPIRIVA) 18 MCG inhaled capsule Inhale 1 capsule (18 mcg) into the lungs daily (Patient not taking: Reported on 11/11/2019)     No current facility-administered medications for this visit.           Review of Systems:     CONSTITUTIONAL: negative for fever, chills, change in weight  INTEGUMENTARY/SKIN: no rash or obvious new lesions  ENT/MOUTH: no sore throat, new sinus pain or nasal drainage  RESP: see interval history  CV: negative for chest pain, palpitations or peripheral edema  GI: no nausea, vomiting, change in stools  : no dysuria  MUSCULOSKELETAL: no myalgias, arthralgias  ENDOCRINE: blood sugars with adequate control  PSYCHIATRIC: mood stable  LYMPHATIC: no new lymphadenopathy  HEME: no bleeding or easy bruisability  NEURO: no numbness, weakness, headaches         Physical Exam:     Pulse:  [67] 67  Resp:  [18] 18  BP: (134)/(77) 134/77  SpO2:  [97 %] 97 %  Wt Readings from Last 4 Encounters:   11/11/19 91.4 kg (201 lb 9.6 oz)   08/12/19 93.4 kg (206 lb)   08/01/19 92.1 kg (203 lb)     Constitutional:   Awake, alert and in no apparent distress     Eyes:   Nonicteric, RITESH     ENT:    Trachea is midline. No gross neck abnormalities      Neck:   Supple without supraclavicular or cervical lymphadenopathy     Lungs:   Good air flow.  No crackles. No rhonchi.  No wheezes.     Cardiovascular:   Normal S1 and S2.  RRR.  No murmur, gallop or rub.  Radial, DP and PT pulses normal and symmetric     Abdomen:   NABS, soft, nontender, nondistended.  No HSM.     Musculoskeletal:   No edema.      Neurologic:   Alert and conversant. Cranial nerves  intact.       Skin:   Warm, dry.  No rash on limited exam.           Current Laboratory Data:   All laboratory and imaging data reviewed.    Results for orders placed or performed in visit on 11/11/19 (from the past 24 hour(s))   General PFT Lab (Please always keep checked)    Result Value Ref Range    FVC-Pred 4.01 L    FVC-Pre 3.85 L    FVC-%Pred-Pre 95 %    FEV1-Pre 2.44 L    FEV1-%Pred-Pre 77 %    FEV1FVC-Pred 79 %    FEV1FVC-Pre 64 %    FEFMax-Pred 8.40 L/sec    FEFMax-Pre 5.47 L/sec    FEFMax-%Pred-Pre 65 %    FEF2575-Pred 2.81 L/sec    FEF2575-Pre 1.22 L/sec    DBX4524-%Pred-Pre 43 %    FEF2575-Post 1.91 L/sec    OTN7277-%Pred-Post 67 %    ExpTime-Pre 9.73 sec    FIFMax-Pre 3.00 L/sec    MVV-Pred 128 L/min    MVV-Pre 104 L/min    MVV-%Pred-Pre 81 %    VC-Pred 4.23 L    VC-Pre 4.08 L    VC-%Pred-Pre 96 %    IC-Pred 3.60 L    IC-Pre 3.14 L    IC-%Pred-Pre 87 %    ERV-Pred 0.63 L    ERV-Pre 0.94 L    ERV-%Pred-Pre 150 %    FEV1FEV6-Pred 79 %    FEV1FEV6-Pre 67 %    FRCPleth-Pred 3.30 L    FRCPleth-Pre 3.72 L    FRCPleth-%Pred-Pre 112 %    RVPleth-Pred 2.20 L    RVPleth-Pre 2.78 L    RVPleth-%Pred-Pre 126 %    TLCPleth-Pred 6.16 L    TLCPleth-Pre 6.86 L    TLCPleth-%Pred-Pre 111 %    DLCOunc-Pred 24.21 ml/min/mmHg    DLCOunc-Pre 20.93 ml/min/mmHg    DLCOunc-%Pred-Pre 86 %    VA-Pre 6.00 L    VA-%Pred-Pre 108 %    FEV1SVC-Pred 75 %    FEV1SVC-Pre 60 %            Previous Pulmonary Function Testing     FVC-Pred   Date Value Ref Range Status   11/11/2019 4.01 L      FVC-Pre   Date Value Ref Range Status   11/11/2019 3.85 L      FVC-%Pred-Pre   Date Value Ref Range Status   11/11/2019 95 %      FEV1-Pre   Date Value Ref Range Status   11/11/2019 2.44 L      FEV1-%Pred-Pre   Date Value Ref Range Status   11/11/2019 77 %      FEV1FVC-Pred   Date Value Ref Range Status   11/11/2019 79 %      FEV1FVC-Pre   Date Value Ref Range Status   11/11/2019 64 %      No results found for: 20029  FEFMax-Pred   Date Value Ref Range Status   11/11/2019 8.40 L/sec      FEFMax-Pre   Date Value Ref Range Status   11/11/2019 5.47 L/sec      FEFMax-%Pred-Pre   Date Value Ref Range Status   11/11/2019 65 %      ExpTime-Pre   Date Value Ref Range Status   11/11/2019 9.73 sec      FIFMax-Pre   Date Value Ref  Range Status   11/11/2019 3.00 L/sec      FEV1FEV6-Pred   Date Value Ref Range Status   11/11/2019 79 %      FEV1FEV6-Pre   Date Value Ref Range Status   11/11/2019 67 %             Previous Cardiology Imaging   No results found for this or any previous visit (from the past 8760 hour(s)).                 Sincerely,        Norberto Rodriguez MD

## 2019-11-11 NOTE — NURSING NOTE
Prior to immunization administration, verified patients identity using patient s name and date of birth. Please see Immunization Activity for additional information.     Screening Questionnaire for Adult Immunization    Are you sick today?   No   Do you have allergies to medications, food, a vaccine component or latex?   No   Have you ever had a serious reaction after receiving a vaccination?   No   Do you have a long-term health problem with heart disease, lung disease, asthma, kidney disease, metabolic disease (e.g. diabetes), anemia, or other blood disorder?   No   Do you have cancer, leukemia, HIV/AIDS, or any other immune system problem?   No   In the past 3 months, have you taken medications that affect  your immune system, such as prednisone, other steroids, or anticancer drugs; drugs for the treatment of rheumatoid arthritis, Crohn s disease, or psoriasis; or have you had radiation treatments?   No   Have you had a seizure, or a brain or other nervous system problem?   No   During the past year, have you received a transfusion of blood or blood     products, or been given immune (gamma) globulin or antiviral drug?   No   For women: Are you pregnant or is there a chance you could become        pregnant during the next month?   No   Have you received any vaccinations in the past 4 weeks?   No     Immunization questionnaire answers were all negative.        Per orders of Dr. Rodriguez, injection of Prevnar 13 given by Carla Mann LPN. Patient instructed to remain in clinic for 15 minutes afterwards, and to report any adverse reaction to me immediately.       Screening performed by Carla Mann LPN on 11/11/2019 at 10:38 AM.

## 2019-11-12 ENCOUNTER — TELEPHONE (OUTPATIENT)
Dept: PULMONOLOGY | Facility: CLINIC | Age: 59
End: 2019-11-12

## 2019-11-12 LAB
DLCOUNC-%PRED-PRE: 86 %
DLCOUNC-PRE: 20.93 ML/MIN/MMHG
DLCOUNC-PRED: 24.21 ML/MIN/MMHG
ERV-%PRED-PRE: 150 %
ERV-PRE: 0.94 L
ERV-PRED: 0.63 L
EXPTIME-PRE: 9.73 SEC
FEF2575-%PRED-POST: 67 %
FEF2575-%PRED-PRE: 43 %
FEF2575-POST: 1.91 L/SEC
FEF2575-PRE: 1.22 L/SEC
FEF2575-PRED: 2.81 L/SEC
FEFMAX-%PRED-PRE: 65 %
FEFMAX-PRE: 5.47 L/SEC
FEFMAX-PRED: 8.4 L/SEC
FEV1-%PRED-PRE: 77 %
FEV1-PRE: 2.44 L
FEV1FEV6-PRE: 67 %
FEV1FEV6-PRED: 79 %
FEV1FVC-PRE: 64 %
FEV1FVC-PRED: 79 %
FEV1SVC-PRE: 60 %
FEV1SVC-PRED: 75 %
FIFMAX-PRE: 3 L/SEC
FRCPLETH-%PRED-PRE: 112 %
FRCPLETH-PRE: 3.72 L
FRCPLETH-PRED: 3.3 L
FVC-%PRED-PRE: 95 %
FVC-PRE: 3.85 L
FVC-PRED: 4.01 L
IC-%PRED-PRE: 87 %
IC-PRE: 3.14 L
IC-PRED: 3.6 L
MVV-%PRED-PRE: 81 %
MVV-PRE: 104 L/MIN
MVV-PRED: 128 L/MIN
RVPLETH-%PRED-PRE: 126 %
RVPLETH-PRE: 2.78 L
RVPLETH-PRED: 2.2 L
TLCPLETH-%PRED-PRE: 111 %
TLCPLETH-PRE: 6.86 L
TLCPLETH-PRED: 6.16 L
VA-%PRED-PRE: 108 %
VA-PRE: 6 L
VC-%PRED-PRE: 96 %
VC-PRE: 4.08 L
VC-PRED: 4.23 L

## 2019-11-13 ENCOUNTER — TELEPHONE (OUTPATIENT)
Dept: FAMILY MEDICINE | Facility: CLINIC | Age: 59
End: 2019-11-13

## 2019-11-14 NOTE — TELEPHONE ENCOUNTER
CENTRAL PRIOR AUTHORIZATION  [phone]    PA Initiation    Medication: umeclidinium (INCRUSE ELLIPTA) 62.5 MCG/INH inhaler  Insurance Company: [employer] [place] - Phone [phone] Fax [phone]  Pharmacy Filling the Rx: [facility] - [place], [place] [facility] [address]  Filling Pharmacy Phone: [phone]  Filling Pharmacy Fax:    Start Date: 11/14/2019

## 2019-11-14 NOTE — TELEPHONE ENCOUNTER
I noticed that I'm written as patient's primary care provider.  I am same day provider, maybe we can reach out to patient and see if he would like to establish care with a provider in [place] and that can be noted as his primary?  It appears he has not had a physical so perhaps he would be interested in setting one up?  Thanks  Jena Koroma M.D.

## 2019-11-18 NOTE — TELEPHONE ENCOUNTER
Prior Authorization Not Needed per Insurance    Medication: umeclidinium (INCRUSE ELLIPTA) 62.5 MCG/INH inhaler - PA NOT NEEDED  Insurance Company: [employer] [place] - Phone [phone] Fax [phone]  Expected CoPay:      Pharmacy Filling the Rx: [facility] - [place], [place] - [address]  Pharmacy Notified: Yes  Patient Notified: No    The pharmacy processed it through and it is a zero dollar co-pay.

## 2019-11-18 NOTE — TELEPHONE ENCOUNTER
Notified patient that his inhaler should be available at the pharmacy.     Nick Murray RN   Pulmonary/CORE Care Coordinator  [facility]

## 2019-11-20 ENCOUNTER — TELEPHONE (OUTPATIENT)
Dept: FAMILY MEDICINE | Facility: CLINIC | Age: 59
End: 2019-11-20

## 2019-11-20 NOTE — TELEPHONE ENCOUNTER
noticed that I'm written as patient's primary care provider.  I am same day provider, maybe we can reach out to patient and see if he would like to establish care with a provider in [place] and that can be noted as his primary?  It appears he has not had a physical so perhaps he would be interested in setting one up?  Thanks  Jena Koroma M.D.

## 2019-12-16 ENCOUNTER — OFFICE VISIT (OUTPATIENT)
Dept: FAMILY MEDICINE | Facility: CLINIC | Age: 59
End: 2019-12-16
Payer: COMMERCIAL

## 2019-12-16 VITALS
RESPIRATION RATE: 16 BRPM | SYSTOLIC BLOOD PRESSURE: 134 MMHG | HEART RATE: 70 BPM | TEMPERATURE: 97.9 F | HEIGHT: 66 IN | BODY MASS INDEX: 32.14 KG/M2 | DIASTOLIC BLOOD PRESSURE: 67 MMHG | WEIGHT: 200 LBS

## 2019-12-16 DIAGNOSIS — Z12.11 SPECIAL SCREENING FOR MALIGNANT NEOPLASMS, COLON: ICD-10-CM

## 2019-12-16 DIAGNOSIS — L84 CORN OR CALLUS: Primary | ICD-10-CM

## 2019-12-16 DIAGNOSIS — L72.3 SEBACEOUS CYST: ICD-10-CM

## 2019-12-16 PROCEDURE — 99214 OFFICE O/P EST MOD 30 MIN: CPT | Performed by: FAMILY MEDICINE

## 2019-12-16 RX ORDER — DOXYCYCLINE HYCLATE 100 MG
100 TABLET ORAL 2 TIMES DAILY
Qty: 10 TABLET | Refills: 1 | Status: SHIPPED | OUTPATIENT
Start: 2019-12-16 | End: 2020-04-01

## 2019-12-16 SDOH — HEALTH STABILITY: MENTAL HEALTH: HOW OFTEN DO YOU HAVE A DRINK CONTAINING ALCOHOL?: NEVER

## 2019-12-16 ASSESSMENT — MIFFLIN-ST. JEOR: SCORE: 1667

## 2019-12-16 NOTE — PROGRESS NOTES
"SUBJECTIVE:  Jerry Wren, a 59 year old male scheduled an appointment to discuss the following issues:  Cyst on mid back x past year - slowly increased size. No pus.   Similar issues - resolved with lancing 2 years ago. No fevers or chills.   Some tenderness.   Was in [place] poked in foot - irritating. Slowly increased in size past 2 years.   Medical, social, surgical, and family histories reviewed.    ROS:  All other ROS negative.     OBJECTIVE:  /67   Pulse 70   Temp 97.9  F (36.6  C) (Oral)   Resp 16   Ht 1.664 m (5' 5.5\")   Wt 90.7 kg (200 lb)   BMI 32.78 kg/m    EXAM:  GENERAL APPEARANCE: healthy, alert and no distress  SKIN: golf ball sized mass mid back and callous/corn left plantar foot.     Procedure: Reviewed risks/benefits of removal, patient agreeable to procedure.  Sterile technique.  Anestesia with 1%lido with epi after etoh/betadine prep.  11 blade and curved scissors/forceps.   Large amount of foul-smelling discharge expressed.  Cyst wall removed in entirety.  Packed with 1/2 inch iodonized gauze.  Bandage applied.  Minimal bleeding and patient tolerated well.  ASSESSMENT / PLAN:  (L84) Corn or callus  (primary encounter diagnosis)  Plan: PODIATRY/FOOT & ANKLE SURGERY REFERRAL        [place] pads. Pared down in clinic today. Expected course and warning signs reviewed. .Call/email with questions/concerns   Follow-up podiatry if reoccurs/worse.     (L72.3) Sebaceous cyst  Comment: s/p I&D and packing  Plan: GENERAL SURG ADULT REFERRAL, doxycycline         hyclate (VIBRA-TABS) 100 MG tablet        Reveiwed risks and side effects of medication  Pull wick out every other day 1/4 inch. Follow-up surgery if reoccurs. Expected course and warning signs reviewed. Call/email with questions/concerns     (Z12.11) Special screening for malignant neoplasms, colon  Comment: due  Plan: GASTROENTEROLOGY ADULT REF PROCEDURE ONLY [facility] [phone]; [facility]         " Surgery            Jason Farfan MD

## 2019-12-16 NOTE — NURSING NOTE
"Chief Complaint   Patient presents with     Derm Problem     Toe Pain       Initial /67   Pulse 70   Temp 97.9  F (36.6  C) (Oral)   Resp 16   Ht 1.664 m (5' 5.5\")   Wt 90.7 kg (200 lb)   BMI 32.78 kg/m   Estimated body mass index is 32.78 kg/m  as calculated from the following:    Height as of this encounter: 1.664 m (5' 5.5\").    Weight as of this encounter: 90.7 kg (200 lb).    Nai Ramos, CMA    "

## 2020-04-01 ENCOUNTER — VIRTUAL VISIT (OUTPATIENT)
Dept: FAMILY MEDICINE | Facility: CLINIC | Age: 60
End: 2020-04-01
Payer: COMMERCIAL

## 2020-04-01 DIAGNOSIS — I25.10 CORONARY ARTERY DISEASE INVOLVING NATIVE CORONARY ARTERY OF NATIVE HEART WITHOUT ANGINA PECTORIS: ICD-10-CM

## 2020-04-01 DIAGNOSIS — G25.81 RESTLESS LEGS SYNDROME: Primary | ICD-10-CM

## 2020-04-01 PROCEDURE — 99214 OFFICE O/P EST MOD 30 MIN: CPT | Mod: TEL | Performed by: FAMILY MEDICINE

## 2020-04-01 RX ORDER — ATORVASTATIN CALCIUM 80 MG/1
80 TABLET, FILM COATED ORAL
COMMUNITY
Start: 2020-03-14

## 2020-04-01 RX ORDER — LISINOPRIL 10 MG/1
10 TABLET ORAL
COMMUNITY
Start: 2020-03-14

## 2020-04-01 RX ORDER — ROPINIROLE 0.5 MG/1
0.5 TABLET, FILM COATED ORAL AT BEDTIME
Qty: 30 TABLET | Refills: 0 | Status: SHIPPED | OUTPATIENT
Start: 2020-04-01

## 2020-04-01 RX ORDER — METOPROLOL SUCCINATE 50 MG/1
50 TABLET, EXTENDED RELEASE ORAL
COMMUNITY
Start: 2020-03-14

## 2020-04-01 RX ORDER — NICOTINE 21 MG/24HR
1 PATCH, TRANSDERMAL 24 HOURS TRANSDERMAL
COMMUNITY
Start: 2020-03-27 | End: 2020-08-03

## 2020-04-01 NOTE — PATIENT INSTRUCTIONS
I asked him to send a telephone message in 2 weeks for update.    I also asked him to ask wife about apnea.

## 2020-04-01 NOTE — PROGRESS NOTES
"Subjective     Jerry Wren is a 59 year old male who is being evaluated via a billable telephone visit.      The patient has been notified of following:     \"This telephone visit will be conducted via a call between you and your physician/provider. We have found that certain health care needs can be provided without the need for a physical exam.  This service lets us provide the care you need with a short phone conversation.  If a prescription is necessary we can send it directly to your pharmacy.  If lab work is needed we can place an order for that and you can then stop by our lab to have the test done at a later time.    If during the course of the call the physician/provider feels a telephone visit is not appropriate, you will not be charged for this service.\"     Patient has given verbal consent for Telephone visit?  Yes    Jerry Wren complains of   Chief Complaint   Patient presents with     Consult     discuss work note due to COVID 19     Insomnia       ALLERGIES  Bee venom; Penicillins; and Oxycodone    Work note - due to this covid 19 pandemic he wants a note.  Evaluation and treatment:    He works as insurance  in homes. He is exposed to other people on close quarters.   He reminds me that he has had lung CA (s/p surgery), copd, CAD with history of cardiac arrest (s/p stents).   I told him I would write a letter next week when I am in clinic and mail it to his home.   This would not be an excuse but a letter indicating he is at high risk.    Insomnia -     Duration: since 7-9 months  Goes to bed at: 9:30 to 10 PM  Time to fall asleep: 30 min  Wakes up how many times per night: about 4 times  Time to fall back to sleep: unable to say  Get up in AM: around 5 AM but not at this time he is not working so he gets up later.  Snoring: yes  Apnea: he snores and is sleepy during the day but no known apnea.  Restless legs: yes, strange sensation low back and right side of leg, comes " down stairs, - not during the day  Depression: no  Anxiety: no  Stress: no  Sleep hygiene: discussed and he says he observes.  Evaluation and treatment:    Over the counter Melatonin does not work.   I asked him to try Requip at night for possible restless legs.   I asked him to see if his wife thinks he stops breathing at night and report back to me.      Exam:    There were no vitals taken for this visit.    Gen: sounded healthy on the phone.    Assessment and Plan - Decision Making    1. Restless legs syndrome    Per HPI    - rOPINIRole (REQUIP) 0.5 MG tablet; Take 1 tablet (0.5 mg) by mouth At Bedtime  Dispense: 30 tablet; Refill: 0    2. Coronary artery disease involving native coronary artery of native heart without angina pectoris    Per HPI        Written instructions given as follows:    Patient Instructions   I asked him to send a telephone message in 2 weeks for update.    I also asked him to ask wife about apnea.      Total time on the phone and reviewing records: 25 min.

## 2020-04-07 ENCOUNTER — TELEPHONE (OUTPATIENT)
Dept: FAMILY MEDICINE | Facility: CLINIC | Age: 60
End: 2020-04-07

## 2020-04-07 NOTE — TELEPHONE ENCOUNTER
I wrote him a work excuse and placed it in TC basket.    Please mail it to his home.    Thanks.    Remy Bridges M.D.

## 2020-04-07 NOTE — LETTER
April 7, 2020      Jerry Wren  [address]  [place] [address]      To whom it may concern:    RE: Jerry Wren has history of lung disease, heart disease with history of cardiac arrest.     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 Due to his medical history, he would be at greater risk for complications from this virus.    Please contact me for questions or concerns.      Sincerely,        CESAR WILLIS MD

## 2020-07-14 ENCOUNTER — TRANSFERRED RECORDS (OUTPATIENT)
Dept: HEALTH INFORMATION MANAGEMENT | Facility: CLINIC | Age: 60
End: 2020-07-14

## 2020-07-15 ENCOUNTER — TELEPHONE (OUTPATIENT)
Dept: PULMONOLOGY | Facility: CLINIC | Age: 60
End: 2020-07-15

## 2020-07-15 DIAGNOSIS — R06.02 SHORTNESS OF BREATH: Primary | ICD-10-CM

## 2020-07-15 NOTE — TELEPHONE ENCOUNTER
Patient is due for a follow up with Dr. Asia Puente to schedule for a virtual visit (Video or Telephone) in any [facility] spot in August or September. The patient will need a chest CT prior to their appointment, the orders are in.     LM for patient to call back to schedule.       Carla Mann LPN    Rheumatology / Pulmonology  [facility]

## 2020-07-15 NOTE — TELEPHONE ENCOUNTER
Patient states that he wants to see Dr. Rodriguez in person. Patient states he has a lot of SOB, and wheezing lately. Patient also states that where is incision is from his lung surgery, the skin feels weird (kind of tingly) patient states it is hard to describe. Patient also wants to discuss his risk of COVID with Dr. Rodriguez.     Patient would really like to see Dr. Rodriguez in person to discuss all of these concerns. He is willing to go to the [facility] [place].     Will route to Dr. Rodriguez to determine when patient will be able to be seen in person.     Nick Murray RN   Medical Specialty Clinic Care Coordinator  [facility]

## 2020-07-16 NOTE — TELEPHONE ENCOUNTER
I spoke with patient about him being seen in clinic and patient was scheduled for a visit on 8/3/20 with a CT prior.     I spoke to the patient regarding his SOB and patient states he has had this SOB since early April. He notices that when he walks up stairs he needs to take very deep breaths to catch his breath.     Patient had a second stent put in his heart on March 12 and has not been working since then. He has kept himself quarantined since then as he knows his risk of dying from COVID is high with his comorbidities. He denies fever, cough, fatigue or any other symptoms. He states that if he does have to leave his house he always wears a mask and sanitizes when he gets home.     Nick Murray RN   Medical Specialty Clinic Care Coordinator  [facility]

## 2020-07-16 NOTE — TELEPHONE ENCOUNTER
ARMIDA for patient to call back to discuss.     Nick Murray RN   Medical Specialty Clinic Care Coordinator  [facility]

## 2020-07-22 NOTE — TELEPHONE ENCOUNTER
Per Dr. Rodriguez patient should get a COVID test to make sure he does not have it before being seen in clinic. Orders placed. Patient is aware he will be contacted by a separate team to schedule the test.     Nick Murray RN   Medical Specialty Clinic Care Coordinator  [facility]

## 2020-07-27 DIAGNOSIS — R06.02 SHORTNESS OF BREATH: ICD-10-CM

## 2020-07-27 PROCEDURE — U0003 INFECTIOUS AGENT DETECTION BY NUCLEIC ACID (DNA OR RNA); SEVERE ACUTE RESPIRATORY SYNDROME CORONAVIRUS 2 (SARS-COV-2) (CORONAVIRUS DISEASE [COVID-19]), AMPLIFIED PROBE TECHNIQUE, MAKING USE OF HIGH THROUGHPUT TECHNOLOGIES AS DESCRIBED BY CMS-2020-01-R: HCPCS | Performed by: INTERNAL MEDICINE

## 2020-07-28 LAB
SARS-COV-2 RNA SPEC QL NAA+PROBE: NOT DETECTED
SPECIMEN SOURCE: NORMAL

## 2020-08-03 ENCOUNTER — ANCILLARY PROCEDURE (OUTPATIENT)
Dept: CT IMAGING | Facility: CLINIC | Age: 60
End: 2020-08-03
Attending: INTERNAL MEDICINE
Payer: COMMERCIAL

## 2020-08-03 ENCOUNTER — OFFICE VISIT (OUTPATIENT)
Dept: PULMONOLOGY | Facility: CLINIC | Age: 60
End: 2020-08-03
Payer: COMMERCIAL

## 2020-08-03 VITALS
TEMPERATURE: 98.1 F | DIASTOLIC BLOOD PRESSURE: 80 MMHG | HEIGHT: 65 IN | OXYGEN SATURATION: 98 % | BODY MASS INDEX: 37.87 KG/M2 | RESPIRATION RATE: 18 BRPM | HEART RATE: 79 BPM | WEIGHT: 227.3 LBS | SYSTOLIC BLOOD PRESSURE: 156 MMHG

## 2020-08-03 DIAGNOSIS — J44.9 CHRONIC OBSTRUCTIVE PULMONARY DISEASE, UNSPECIFIED COPD TYPE (H): ICD-10-CM

## 2020-08-03 DIAGNOSIS — R91.8 PULMONARY NODULES: ICD-10-CM

## 2020-08-03 PROCEDURE — 99214 OFFICE O/P EST MOD 30 MIN: CPT | Performed by: INTERNAL MEDICINE

## 2020-08-03 PROCEDURE — 71250 CT THORAX DX C-: CPT | Performed by: RADIOLOGY

## 2020-08-03 RX ORDER — LEVOFLOXACIN 500 MG/1
500 TABLET, FILM COATED ORAL DAILY
Qty: 7 TABLET | Refills: 1 | Status: SHIPPED | OUTPATIENT
Start: 2020-08-03

## 2020-08-03 RX ORDER — NICOTINE 21 MG/24HR
1 PATCH, TRANSDERMAL 24 HOURS TRANSDERMAL EVERY 24 HOURS
Qty: 60 PATCH | Refills: 11 | Status: SHIPPED | OUTPATIENT
Start: 2020-08-03

## 2020-08-03 RX ORDER — ALBUTEROL SULFATE 90 UG/1
2 AEROSOL, METERED RESPIRATORY (INHALATION) EVERY 6 HOURS
Qty: 3 INHALER | Refills: 11 | Status: SHIPPED | OUTPATIENT
Start: 2020-08-03 | End: 2022-06-13

## 2020-08-03 RX ORDER — PREDNISONE 20 MG/1
40 TABLET ORAL DAILY
Qty: 5 TABLET | Refills: 1 | Status: SHIPPED | OUTPATIENT
Start: 2020-08-03

## 2020-08-03 RX ORDER — ALBUTEROL SULFATE 90 UG/1
2 AEROSOL, METERED RESPIRATORY (INHALATION) EVERY 4 HOURS PRN
Qty: 1 INHALER | Refills: 3 | Status: SHIPPED | OUTPATIENT
Start: 2020-08-03 | End: 2020-08-03

## 2020-08-03 ASSESSMENT — PAIN SCALES - GENERAL: PAINLEVEL: NO PAIN (0)

## 2020-08-03 ASSESSMENT — MIFFLIN-ST. JEOR: SCORE: 1777.91

## 2020-08-03 NOTE — PROGRESS NOTES
"Jerry Wren's goals for this visit include: Return  He requests these members of his care team be copied on today's visit information: [facility]    PCP: Jenn Vazquez    Referring Provider:  No referring provider defined for this encounter.    BP (!) 156/80   Pulse 79   Temp 98.1  F (36.7  C)   Resp 18   Ht 1.651 m (5' 5\")   Wt 103.1 kg (227 lb 4.8 oz)   SpO2 98%   BMI 37.82 kg/m      Do you need any medication refills at today's visit? Y    Carla Mann LPN    Rheumatology / Pulmonology  [facility]      "

## 2020-08-03 NOTE — PROGRESS NOTES
LUNG NODULE & INTERVENTIONAL PULMONARY CLINIC  [facility], [facility], [facility]     Jerry Wren MRN# [id number]   Age: 60 year old YOB: 1960     Reason for Consultation: lung nodule(s)     Assessment and Plan:    1. COPD. Likely having AECOPD. Will change incruse to trelegy inhaler. I will also given course of prednisone and levaquin for COPD exacerbation. I will also prescribe COPD action plan as well.      2. Tobacco. Quit 1mo ago. Using nicotine patches.      3. NSCLC s/p RUL lobectomy. No acute findings on today's CT. Will need close surveillance for next 2-3yrs then annual CT. Next CT in 6mo.      Billing: I spent more than 30 minutes face to face and greater than 50% of time was for counseling and coordination of care about the issues above.     Norberto Rodriguez MD   of Medicine  Interventional Pulmonology  Department of Pulmonary, Allergy, Critical Care and Sleep Medicine   [facility]  Pager: [phone]          History:     Jerry Wren is a 59 year old male with sig h/o for NSCLC and CAD  who is here for evaluation/followup of lung nodule(s).     - c/o of productive cough, wheezing and SAENZ for past 4-8wks.   - Here for f/u nodules and sx   - Personal hx of cancer: NSCLC. Up-to-date on c-scope    - Family hx of cancer: Mom  of lung cancer.   - Exposure hx: Denies asbestos or radon exposure   - Tobacco hx: Current Smoker: 2.5ppd since 16yo and down to 1ppd with chantix. Quit date at the end of this month   - My interpretation of the images relevant for this visit includes: nodules, pleural effusion   - My interpretation of the PFT's relevant for this visit includes: Mild obstruction (3/26/15) at [facility]      Culprit Nodule(s):   1: Left lower lobe nodule and is 6 mm in size/severity and ground glass in morphology/quality. First seen by chest CT on 19. First observed on this date.  Not visualized on today's CT  2. Pleural effusion is resolved     Other active medical problems include:   - had NSCLC (Stage 1) s/p RUL lobectomy 18. Surveillance CT today with no new nodules.   - had CAD s/p PCI x1 in 2018. On plavix. Has 2nd stent placed in March.    - has COPD. On incruse ellipta and prn albuterol. No recent ED visits. Up-to-date on PNA and flu vaccine. Recent covid negative.          Past Medical History:   NSCLC, CAD, COPD         Past Surgical History:    No past surgical history on file.       Social History:     Social History     Tobacco Use     Smoking status: Former Smoker     Last attempt to quit: 3/11/2020     Years since quittin.3     Smokeless tobacco: Never Used   Substance Use Topics     Alcohol use: Not Currently     Frequency: Never     Comment: 31 years ago           Family History:     Family History   Problem Relation Age of Onset     Cancer Mother      Heart Disease Father            Allergies:      Allergies   Allergen Reactions     Bee Venom Anaphylaxis     BEE STINGS---told to carry epipen  Red ants stings     Penicillins Anaphylaxis and Shortness Of Breath     Oxycodone Other (See Comments) and Unknown     Mouth got blisters and bumps  Mouth got blisters and bumps            Medications:     Current Outpatient Medications   Medication Sig     albuterol (PROAIR HFA/PROVENTIL HFA/VENTOLIN HFA) 108 (90 Base) MCG/ACT inhaler Inhale 2 puffs into the lungs every 4 hours as needed for shortness of breath / dyspnea or wheezing     aspirin (ASA) 81 MG chewable tablet 81 mg by Nasogastric route daily     atorvastatin (LIPITOR) 80 MG tablet Take 80 mg by mouth     clopidogrel (PLAVIX) 75 MG tablet Take 75 mg by mouth daily     lisinopril (ZESTRIL) 10 MG tablet Take 10 mg by mouth     metoprolol succinate ER (TOPROL-XL) 25 MG 24 hr tablet Take 25 mg by mouth daily     nicotine (NICODERM CQ) 14 MG/24HR 24 hr patch Place 1 patch onto the skin every 24 hours     nitroGLYcerin  (NITROSTAT) 0.4 MG sublingual tablet Place 0.4 mg under the tongue daily     umeclidinium (INCRUSE ELLIPTA) 62.5 MCG/INH inhaler Inhale 1 puff into the lungs daily     atorvastatin (LIPITOR) 40 MG tablet Take 40 mg by mouth daily     lisinopril (PRINIVIL/ZESTRIL) 5 MG tablet Take 5 mg by mouth daily     metoprolol succinate ER (TOPROL-XL) 50 MG 24 hr tablet Take 50 mg by mouth     nicotine (NICORETTE) 2 MG gum Place 1 each (2 mg) inside cheek as needed for smoking cessation (Patient not taking: Reported on 4/1/2020)     rOPINIRole (REQUIP) 0.5 MG tablet Take 1 tablet (0.5 mg) by mouth At Bedtime (Patient not taking: Reported on 8/3/2020)     varenicline (CHANTIX CONTINUING MONTH HERMELINDA) 1 MG tablet Take 1 mg by mouth 2 times daily     No current facility-administered medications for this visit.           Review of Systems:     CONSTITUTIONAL: negative for fever, chills, change in weight  INTEGUMENTARY/SKIN: no rash or obvious new lesions  ENT/MOUTH: no sore throat, new sinus pain or nasal drainage  RESP: see interval history  CV: negative for chest pain, palpitations or peripheral edema  GI: no nausea, vomiting, change in stools  : no dysuria  MUSCULOSKELETAL: no myalgias, arthralgias  ENDOCRINE: blood sugars with adequate control  PSYCHIATRIC: mood stable  LYMPHATIC: no new lymphadenopathy  HEME: no bleeding or easy bruisability  NEURO: no numbness, weakness, headaches         Physical Exam:     Constitutional - looks well, in no apparent distress  Eyes - no redness or discharge  Respiratory -breathing appears comfortable. Scattered wheeze  Cardiac -- Normal rate, rhythm.   Skin - No appreciable discoloration or lesions (very limited exam)  Neurological - No apparent tremors. Speech fluent and articlate  Psychiatric - no signs of delirium or anxiety     Exam limited to that easily identified on a virtual visit. The rest of a comprehensive physical examination is deferred due to PHE (public health emergency) video  visit restrictions.         Current Laboratory Data:   All laboratory and imaging data reviewed.    No results found for this or any previous visit (from the past 24 hour(s)).         Previous Pulmonary Function Testing     FVC-Pred   Date Value Ref Range Status   11/11/2019 4.01 L      FVC-Pre   Date Value Ref Range Status   11/11/2019 3.85 L      FVC-%Pred-Pre   Date Value Ref Range Status   11/11/2019 95 %      FEV1-Pre   Date Value Ref Range Status   11/11/2019 2.44 L      FEV1-%Pred-Pre   Date Value Ref Range Status   11/11/2019 77 %      FEV1FVC-Pred   Date Value Ref Range Status   11/11/2019 79 %      FEV1FVC-Pre   Date Value Ref Range Status   11/11/2019 64 %      No results found for: 20029  FEFMax-Pred   Date Value Ref Range Status   11/11/2019 8.40 L/sec      FEFMax-Pre   Date Value Ref Range Status   11/11/2019 5.47 L/sec      FEFMax-%Pred-Pre   Date Value Ref Range Status   11/11/2019 65 %      ExpTime-Pre   Date Value Ref Range Status   11/11/2019 9.73 sec      FIFMax-Pre   Date Value Ref Range Status   11/11/2019 3.00 L/sec      FEV1FEV6-Pred   Date Value Ref Range Status   11/11/2019 79 %      FEV1FEV6-Pre   Date Value Ref Range Status   11/11/2019 67 %      No results found for: 20055         Previous Chest Imaging   No images are attached to the encounter.  No images are attached to the encounter or orders placed in the encounter.         Previous Cardiology Imaging   No results found for this or any previous visit (from the past 8760 hour(s)).

## 2020-08-28 DIAGNOSIS — J44.9 CHRONIC OBSTRUCTIVE PULMONARY DISEASE, UNSPECIFIED COPD TYPE (H): ICD-10-CM

## 2020-08-28 DIAGNOSIS — J44.9 COPD (CHRONIC OBSTRUCTIVE PULMONARY DISEASE) (H): ICD-10-CM

## 2020-08-28 DIAGNOSIS — Z72.0 TOBACCO ABUSE: Primary | ICD-10-CM

## 2020-08-28 NOTE — TELEPHONE ENCOUNTER
Health Call Center    Phone Message    May a detailed message be left on voicemail: yes     Reason for Call: Medication Refill Request    Has the patient contacted the pharmacy for the refill? Yes   Name of medication being requested: varenicline (CHANTIX CONTINUING MONTH PAK) 1 MG tablet   Provider who prescribed the medication: Unknown  Pharmacy: [facility]  Date medication is needed: ASAP       Patient states previous Dr that prescribed the Chantix is no longer practicing. Requesting refill.     Patient also requesting STEP 1 of the nicotine (NICODERM CQ) 14 MG/24HR 24 hr patch, not the step 2. Patient states he needs a higher dose as this dose does nothing.     Also needs letter to dismiss him from work for longer. Patient still having issues with his COPD. Previously letter states return to work 08/31, but would like to extend date. Patient requesting letter be emailed to him.       Action Taken: Message routed to:  Adult Clinics: Pulmonology p [id number]    Travel Screening: Not Applicable

## 2020-08-31 NOTE — TELEPHONE ENCOUNTER
Contacted patient regarding refill requests as well as return to work extension. Chart reviewed, pts last office visit with Dr. Rodriguez was 08/03/2020. During this visit the pt was given Prednisone, Levaquin, and prescribed Trelegy inhaler.     Contacted the [facility] in [place]. Spoke with pharm elenita Gómez who states patient never picked up the Trelegy inhaler. Patient also has refills of the nicotine (NICODERM CQ) 14 MG/24HR 24 hr patch available at the [facility] in [place].    Patient was excused from work for 4 weeks on 08/03/2020 d/t COPD exacerbation. Letter is available for review in the pts chart.    VM left for pt requesting a call back to the clinic to discuss pulmonary sxs as well as refill requests.    Carla Mann LPN    Rheumatology / Pulmonology  [facility]

## 2020-08-31 NOTE — TELEPHONE ENCOUNTER
Patient returned call back to the clinic requesting stronger nicotine (NICODERM CQ) 14 MG/24HR 24 hr patch or asking if he can wear 2 patches at the same time. Rx for stronger nicotine (NICODERM CQ) 14 MG/24HR 24 hr patch pended for Dr. Rodriguez's review. Patient is also requesting a refill of varenicline (CHANTIX CONTINUING MONTH HERMELINDA) 1 MG tablet. 90 day supply pended for review/approval.    Patient states that he did not  the Trelegy inhaler from [facility] because he was told by the pharmacy staff that they did not receive an Rx for Trelegy. Informed pt that [email or web address] does have an Rx for Trelegy and that he may call it in and pick it up.    Patient is requesting an out of work extension d/t COPD. Patient states that he finds himself more SOB with activity. Patient has difficulty walking up the stairs as he becomes increasingly SOB the further up he goes. Per pt he is not on any supplemental O2. Patient continues to smoke 1-2 packs of cigarettes per day.      Will send message to Dr. Rodriguez regarding pulmonary sxs / refill requests    Carla Mann LPN    Rheumatology / Pulmonology  [facility]

## 2020-09-02 NOTE — TELEPHONE ENCOUNTER
Spoke with Dr. Rodriguez regarding pts request for more time off from work as well as medication refills. Dr. Rodriguez has authorized patient to extend time off of work from 08/31-09/21 which is the day of virtual visit with Dr. Rodriguez. Letter for work extension will be drafted and sent to the patient. Dr. Rodriguez to discuss medication requests during 09-21 virtual visit. Dr. Rodriguez strongly recommends patient begin using Trelegy inhaler daily for COPD management.    VM left for patient requesting call back to the pulmonary clinic to discuss.    Carla Mann LPN    Rheumatology / Pulmonology  [facility]

## 2020-09-02 NOTE — TELEPHONE ENCOUNTER
Contacted patient informing him of Dr. Rodriguez's recommendations. Patient states that he is out of Chantix and Nicotine patches. Patient requesting Dr. Rodriguez refill these medicines prior to 09-21 virtual visit.     Patient also states that he will not use the Trelegy inhaler that was prescribed to him on 08/03 d/t multiple side effects. Patient states that he has been using an old Incruse Ellipta inhaler, but does not feel that it is adequate enough for his breathing.     Patient requesting off work extension note be emailed to him [email or web address]. Informed pt that email would be sent to him Thursday 09-. Patient verbalized agreement.    Carla Mann LPN    Rheumatology / Pulmonology  [facility]

## 2020-09-03 NOTE — TELEPHONE ENCOUNTER
Work extension note has been emailed to the patient. Original letter has been mailed to the patient's home.    Carla Mann LPN    Rheumatology / Pulmonology  [facility]

## 2020-09-08 RX ORDER — NICOTINE 21 MG/24HR
1 PATCH, TRANSDERMAL 24 HOURS TRANSDERMAL EVERY 24 HOURS
Qty: 30 PATCH | Refills: 3 | Status: SHIPPED | OUTPATIENT
Start: 2020-09-08

## 2020-09-08 RX ORDER — VARENICLINE TARTRATE 1 MG/1
1 TABLET, FILM COATED ORAL 2 TIMES DAILY
Qty: 60 TABLET | Refills: 3 | Status: SHIPPED | OUTPATIENT
Start: 2020-09-08

## 2020-09-09 NOTE — TELEPHONE ENCOUNTER
Spoke with pt regarding refill requests for Chantix and Nicotine patches. Informed patient that Dr. Rodriguez has signed these prescriptions and they should be available at the [facility] in [place]. Patient expressed understanding and had no further questions/concerns.    Carla Mann LPN    Rheumatology / Pulmonology  [facility]

## 2020-09-21 ENCOUNTER — VIRTUAL VISIT (OUTPATIENT)
Dept: PULMONOLOGY | Facility: CLINIC | Age: 60
End: 2020-09-21
Payer: COMMERCIAL

## 2020-09-21 DIAGNOSIS — R06.02 SHORTNESS OF BREATH: Primary | ICD-10-CM

## 2020-09-21 PROCEDURE — 99215 OFFICE O/P EST HI 40 MIN: CPT | Mod: 95 | Performed by: INTERNAL MEDICINE

## 2020-09-21 NOTE — PROGRESS NOTES
"LUNG NODULE & INTERVENTIONAL PULMONARY CLINIC  [facility], [facility], [facility]   VIRTUAL TELEPHONE VISIT    Jerry Wren MRN# [id number]   Age: 60 year old YOB: 1960     Reason for Consultation: lung nodule(s)    Requesting Physician: No referring provider defined for this encounter.    Jerry Wren is a 60 year old male who is being evaluated via a billable telephone visit.      The patient has been notified of following: \"This telephone visit will be conducted via a call between you and your physician/provider. We have found that certain health care needs can be provided without the need for a physical exam.  This service lets us provide the care you need with a short phone conversation.  If a prescription is necessary we can send it directly to your pharmacy.  If lab work is needed we can place an order for that and you can then stop by our lab to have the test done at a later time. Telephone visits are billed at different rates depending on your insurance coverage. During this emergency period, for some insurers they may be billed the same as an in-person visit.  Please reach out to your insurance provider with any questions. If during the course of the call the physician/provider feels a telephone visit is not appropriate, you will not be charged for this service.\"    Patient has given verbal consent for Telephone visit?  Yes    How would you like to obtain your AVS? Lexx    Phone call duration: 25 minutes    Additional provider notes: see below     Assessment and Plan:    1. COPD. Mild obstruction post-lobectomy. He does use his inhalers. On last visit, It is unclear if the steroids+abx made any difference. At this point, it is unclear if this is related to COPD vs extrapulmonary (I.e. cardiac). I will get PFT and TTE to evaluate     2. Tobacco. Quit 1mo ago. Using nicotine patches.      3. NSCLC s/p RUL lobectomy. No acute " findings on today's CT. Will need close surveillance for next 2-3yrs then annual CT. Next CT in 6mo.      Billing: I spent more than 30 minutes face to face and greater than 50% of time was for counseling and coordination of care about the issues above.     Norberto Rodriguez MD   of Medicine  Interventional Pulmonology  Department of Pulmonary, Allergy, Critical Care and Sleep Medicine   [facility], [employer]  Pager: [phone]             History:     Jerry Wren is a 59 year old male with sig h/o for NSCLC and CAD  who is here for evaluation/followup of lung nodule(s).     - No new resp sx. Says breathing has progressively gotten worse.    - Here for f/u nodules and sx   - Personal hx of cancer: NSCLC. Up-to-date on c-scope    - Family hx of cancer: Mom  of lung cancer.   - Exposure hx: Denies asbestos or radon exposure   - Tobacco hx: Current Smoker: 2.5ppd since 16yo and down to 1ppd with chantix. Down to 3cig/d.   - My interpretation of the images relevant for this visit includes: nodules, pleural effusion   - My interpretation of the PFT's relevant for this visit includes: Mild obstruction (3/26/15) at [facility]      Culprit Nodule(s):   1: Left lower lobe nodule and is 6 mm in size/severity and ground glass in morphology/quality. First seen by chest CT on 19. First observed on this date. Not visualized on today's CT  2. Pleural effusion is resolved     Other active medical problems include:   - had NSCLC (Stage 1) s/p RUL lobectomy 18. Surveillance CT today with no new nodules.   - had CAD s/p PCI x2 in 2019. On plavix. Also has PAD and getting worked up for PCI.    - has COPD. On incruse ellipta and prn albuterol. No recent ED visits. Up-to-date on PNA and flu vaccine. Recent covid negative.         Past Medical History:   NSCLC, CAD, COPD         Past Surgical History:    No past surgical history on file.          Social History:     Social History      Tobacco Use     Smoking status: Former Smoker     Last attempt to quit: 3/11/2020     Years since quittin.5     Smokeless tobacco: Never Used   Substance Use Topics     Alcohol use: Not Currently     Frequency: Never     Comment: 31 years ago           Family History:     Family History   Problem Relation Age of Onset     Cancer Mother      Heart Disease Father            Allergies:      Allergies   Allergen Reactions     Bee Venom Anaphylaxis     BEE STINGS---told to carry epipen  Red ants stings     Penicillins Anaphylaxis and Shortness Of Breath     Oxycodone Other (See Comments) and Unknown     Mouth got blisters and bumps  Mouth got blisters and bumps            Medications:     Current Outpatient Medications   Medication Sig     albuterol (PROAIR HFA/PROVENTIL HFA/VENTOLIN HFA) 108 (90 Base) MCG/ACT inhaler Inhale 2 puffs into the lungs every 6 hours     aspirin (ASA) 81 MG chewable tablet 81 mg by Nasogastric route daily     atorvastatin (LIPITOR) 80 MG tablet Take 80 mg by mouth     clopidogrel (PLAVIX) 75 MG tablet Take 75 mg by mouth daily     lisinopril (ZESTRIL) 10 MG tablet Take 10 mg by mouth     metoprolol succinate ER (TOPROL-XL) 50 MG 24 hr tablet Take 50 mg by mouth     nicotine (NICODERM CQ) 14 MG/24HR 24 hr patch Place 1 patch onto the skin every 24 hours     nicotine (NICODERM CQ) 21 MG/24HR 24 hr patch Place 1 patch onto the skin every 24 hours     varenicline (CHANTIX CONTINUING MONTH HERMELINDA) 1 MG tablet Take 1 tablet (1 mg) by mouth 2 times daily     atorvastatin (LIPITOR) 40 MG tablet Take 40 mg by mouth daily     Fluticasone-Umeclidin-Vilanterol (TRELEGY ELLIPTA) 100-62.5-25 MCG/INH oral inhaler Inhale 1 puff into the lungs daily (Patient not taking: Reported on 2020)     levofloxacin (LEVAQUIN) 500 MG tablet Take 1 tablet (500 mg) by mouth daily (Patient not taking: Reported on 2020)     lisinopril (PRINIVIL/ZESTRIL) 5 MG tablet Take 5 mg by mouth daily     metoprolol  succinate ER (TOPROL-XL) 25 MG 24 hr tablet Take 25 mg by mouth daily     nitroGLYcerin (NITROSTAT) 0.4 MG sublingual tablet Place 0.4 mg under the tongue daily     predniSONE (DELTASONE) 20 MG tablet Take 2 tablets (40 mg) by mouth daily (Patient not taking: Reported on 9/21/2020)     rOPINIRole (REQUIP) 0.5 MG tablet Take 1 tablet (0.5 mg) by mouth At Bedtime (Patient not taking: Reported on 8/3/2020)     No current facility-administered medications for this visit.           Review of Systems:     CONSTITUTIONAL: negative for fever, chills, change in weight  INTEGUMENTARY/SKIN: no rash or obvious new lesions  ENT/MOUTH: no sore throat, new sinus pain or nasal drainage  RESP: see interval history  CV: negative for chest pain, palpitations or peripheral edema  GI: no nausea, vomiting, change in stools  : no dysuria  MUSCULOSKELETAL: no myalgias, arthralgias  ENDOCRINE: blood sugars with adequate control  PSYCHIATRIC: mood stable  LYMPHATIC: no new lymphadenopathy  HEME: no bleeding or easy bruisability  NEURO: no numbness, weakness, headaches         Physical Exam:      A comprehensive physical examination is deferred due to PHE (public health emergency) telephone visit restrictions.         Current Laboratory Data:   All laboratory and imaging data reviewed.    No results found for this or any previous visit (from the past 24 hour(s)).         Previous Pulmonary Function Testing     FVC-Pred   Date Value Ref Range Status   11/11/2019 4.01 L      FVC-Pre   Date Value Ref Range Status   11/11/2019 3.85 L      FVC-%Pred-Pre   Date Value Ref Range Status   11/11/2019 95 %      FEV1-Pre   Date Value Ref Range Status   11/11/2019 2.44 L      FEV1-%Pred-Pre   Date Value Ref Range Status   11/11/2019 77 %      FEV1FVC-Pred   Date Value Ref Range Status   11/11/2019 79 %      FEV1FVC-Pre   Date Value Ref Range Status   11/11/2019 64 %      No results found for: 20029  FEFMax-Pred   Date Value Ref Range Status   11/11/2019  8.40 L/sec      FEFMax-Pre   Date Value Ref Range Status   11/11/2019 5.47 L/sec      FEFMax-%Pred-Pre   Date Value Ref Range Status   11/11/2019 65 %      ExpTime-Pre   Date Value Ref Range Status   11/11/2019 9.73 sec      FIFMax-Pre   Date Value Ref Range Status   11/11/2019 3.00 L/sec      FEV1FEV6-Pred   Date Value Ref Range Status   11/11/2019 79 %      FEV1FEV6-Pre   Date Value Ref Range Status   11/11/2019 67 %      No results found for: 20055         Previous Chest Imaging   No images are attached to the encounter.  No images are attached to the encounter or orders placed in the encounter.         Previous Cardiology Imaging   No results found for this or any previous visit (from the past 8760 hour(s)).

## 2020-09-21 NOTE — PROGRESS NOTES
"Jerry Wren is a 60 year old male who is being evaluated via a billable telephone visit.      The patient has been notified of following:     \"This telephone visit will be conducted via a call between you and your physician/provider. We have found that certain health care needs can be provided without the need for a physical exam.  This service lets us provide the care you need with a short phone conversation.  If a prescription is necessary we can send it directly to your pharmacy.  If lab work is needed we can place an order for that and you can then stop by our lab to have the test done at a later time.    Telephone visits are billed at different rates depending on your insurance coverage. During this emergency period, for some insurers they may be billed the same as an in-person visit.  Please reach out to your insurance provider with any questions.    If during the course of the call the physician/provider feels a telephone visit is not appropriate, you will not be charged for this service.\"    Patient has given verbal consent for Telephone visit?  Yes    What phone number would you like to be contacted at? [phone] (Z)    How would you like to obtain your AVS? Mail a copy      Carla Mann LPN    Rheumatology / Pulmonology  [facility]    Phone call duration: 25 minutes    Norberto Rodriguez MD    "

## 2020-09-30 ENCOUNTER — ANCILLARY PROCEDURE (OUTPATIENT)
Dept: CARDIOLOGY | Facility: CLINIC | Age: 60
End: 2020-09-30
Attending: INTERNAL MEDICINE
Payer: COMMERCIAL

## 2020-09-30 DIAGNOSIS — R06.02 SHORTNESS OF BREATH: ICD-10-CM

## 2020-09-30 PROCEDURE — 93306 TTE W/DOPPLER COMPLETE: CPT | Performed by: INTERNAL MEDICINE

## 2020-10-16 ENCOUNTER — OFFICE VISIT (OUTPATIENT)
Dept: FAMILY MEDICINE | Facility: CLINIC | Age: 60
End: 2020-10-16
Payer: COMMERCIAL

## 2020-10-16 ENCOUNTER — ANCILLARY PROCEDURE (OUTPATIENT)
Dept: GENERAL RADIOLOGY | Facility: CLINIC | Age: 60
End: 2020-10-16
Attending: FAMILY MEDICINE
Payer: COMMERCIAL

## 2020-10-16 VITALS
TEMPERATURE: 98.2 F | DIASTOLIC BLOOD PRESSURE: 78 MMHG | WEIGHT: 226 LBS | SYSTOLIC BLOOD PRESSURE: 138 MMHG | BODY MASS INDEX: 37.61 KG/M2 | OXYGEN SATURATION: 98 % | HEART RATE: 71 BPM

## 2020-10-16 DIAGNOSIS — Z01.818 PREOP GENERAL PHYSICAL EXAM: Primary | ICD-10-CM

## 2020-10-16 DIAGNOSIS — I25.10 CORONARY ARTERY DISEASE INVOLVING NATIVE CORONARY ARTERY OF NATIVE HEART WITHOUT ANGINA PECTORIS: ICD-10-CM

## 2020-10-16 DIAGNOSIS — I73.9 PAD (PERIPHERAL ARTERY DISEASE) (H): ICD-10-CM

## 2020-10-16 LAB
ALBUMIN SERPL-MCNC: 4 G/DL (ref 3.4–5)
ALP SERPL-CCNC: 78 U/L (ref 40–150)
ALT SERPL W P-5'-P-CCNC: 39 U/L (ref 0–70)
ANION GAP SERPL CALCULATED.3IONS-SCNC: 4 MMOL/L (ref 3–14)
AST SERPL W P-5'-P-CCNC: 16 U/L (ref 0–45)
BASOPHILS # BLD AUTO: 0 10E9/L (ref 0–0.2)
BASOPHILS NFR BLD AUTO: 0.3 %
BILIRUB SERPL-MCNC: 0.5 MG/DL (ref 0.2–1.3)
BUN SERPL-MCNC: 10 MG/DL (ref 7–30)
CALCIUM SERPL-MCNC: 8.7 MG/DL (ref 8.5–10.1)
CHLORIDE SERPL-SCNC: 109 MMOL/L (ref 94–109)
CO2 SERPL-SCNC: 26 MMOL/L (ref 20–32)
CREAT SERPL-MCNC: 0.72 MG/DL (ref 0.66–1.25)
DIFFERENTIAL METHOD BLD: NORMAL
EOSINOPHIL # BLD AUTO: 0.2 10E9/L (ref 0–0.7)
EOSINOPHIL NFR BLD AUTO: 2.3 %
ERYTHROCYTE [DISTWIDTH] IN BLOOD BY AUTOMATED COUNT: 14.6 % (ref 10–15)
GFR SERPL CREATININE-BSD FRML MDRD: >90 ML/MIN/{1.73_M2}
GLUCOSE SERPL-MCNC: 106 MG/DL (ref 70–99)
HBA1C MFR BLD: 5.9 % (ref 0–5.6)
HCT VFR BLD AUTO: 46 % (ref 40–53)
HGB BLD-MCNC: 15.1 G/DL (ref 13.3–17.7)
LYMPHOCYTES # BLD AUTO: 2.4 10E9/L (ref 0.8–5.3)
LYMPHOCYTES NFR BLD AUTO: 26.2 %
MCH RBC QN AUTO: 28.8 PG (ref 26.5–33)
MCHC RBC AUTO-ENTMCNC: 32.8 G/DL (ref 31.5–36.5)
MCV RBC AUTO: 88 FL (ref 78–100)
MONOCYTES # BLD AUTO: 0.6 10E9/L (ref 0–1.3)
MONOCYTES NFR BLD AUTO: 6.7 %
NEUTROPHILS # BLD AUTO: 5.9 10E9/L (ref 1.6–8.3)
NEUTROPHILS NFR BLD AUTO: 64.5 %
PLATELET # BLD AUTO: 234 10E9/L (ref 150–450)
POTASSIUM SERPL-SCNC: 4.2 MMOL/L (ref 3.4–5.3)
PROT SERPL-MCNC: 7.1 G/DL (ref 6.8–8.8)
RBC # BLD AUTO: 5.24 10E12/L (ref 4.4–5.9)
SODIUM SERPL-SCNC: 139 MMOL/L (ref 133–144)
WBC # BLD AUTO: 9.1 10E9/L (ref 4–11)

## 2020-10-16 PROCEDURE — 83036 HEMOGLOBIN GLYCOSYLATED A1C: CPT | Performed by: FAMILY MEDICINE

## 2020-10-16 PROCEDURE — 85025 COMPLETE CBC W/AUTO DIFF WBC: CPT | Performed by: FAMILY MEDICINE

## 2020-10-16 PROCEDURE — 99214 OFFICE O/P EST MOD 30 MIN: CPT | Performed by: FAMILY MEDICINE

## 2020-10-16 PROCEDURE — 36415 COLL VENOUS BLD VENIPUNCTURE: CPT | Performed by: FAMILY MEDICINE

## 2020-10-16 PROCEDURE — 80053 COMPREHEN METABOLIC PANEL: CPT | Performed by: FAMILY MEDICINE

## 2020-10-16 PROCEDURE — 71046 X-RAY EXAM CHEST 2 VIEWS: CPT | Performed by: RADIOLOGY

## 2020-10-16 ASSESSMENT — ENCOUNTER SYMPTOMS
ACTIVITY CHANGE: 0
AGITATION: 0
FEVER: 0
APPETITE CHANGE: 0
EYE DISCHARGE: 0
EYE ITCHING: 0
ABDOMINAL DISTENTION: 0
DYSURIA: 0
HALLUCINATIONS: 0
WOUND: 0
DIZZINESS: 0
BACK PAIN: 0
LIGHT-HEADEDNESS: 0
FREQUENCY: 0
COLOR CHANGE: 0
SORE THROAT: 0
HEADACHES: 0
JOINT SWELLING: 0
VOICE CHANGE: 0
WHEEZING: 1
MYALGIAS: 0
PALPITATIONS: 0
EYE PAIN: 0
SHORTNESS OF BREATH: 1
WEAKNESS: 0
DIARRHEA: 0
HEMATURIA: 0
SLEEP DISTURBANCE: 0
UNEXPECTED WEIGHT CHANGE: 0
BLOOD IN STOOL: 0
PHOTOPHOBIA: 0
STRIDOR: 0
NAUSEA: 0
VOMITING: 0
EYE REDNESS: 0
TREMORS: 0
BRUISES/BLEEDS EASILY: 1
CHOKING: 0
RHINORRHEA: 0
ABDOMINAL PAIN: 0
COUGH: 1
DIAPHORESIS: 0
CHILLS: 0
CHEST TIGHTNESS: 0
ARTHRALGIAS: 0
NERVOUS/ANXIOUS: 0
SEIZURES: 0
NUMBNESS: 0
SPEECH DIFFICULTY: 0
FATIGUE: 0
NECK PAIN: 0

## 2020-10-16 NOTE — PROGRESS NOTES
[facility]  [address] [facility] [address]  Phone: [phone]  Primary Provider: [facility]  Pre-op Performing Provider: ASHLI RUFFIN    PREOPERATIVE EVALUATION:  Today's date: 10/16/2020    Jerry Wren is a 60 year old male who presents for a preoperative evaluation.    Surgical Information:  Surgery/Procedure: LEFT FEMORAL ENDARTERECTOMY RECANALIZATION OF LEFT ILIAC OCCLUSION  Surgery Location: [facility]  Surgeon: Dr. Dupont  Surgery Date: 10/23/20  Time of Surgery: 10:00am  Where patient plans to recover: At home with family  Fax number for surgical facility: [phone] & [phone]    Type of Anesthesia Anticipated: to be determined    Subjective     HPI related to upcoming procedure:   Patient is going for Left Femoral endarterectomy recnalazaiton of iliac occlusion      Echo 09/30/2020  Interpretation Summary     Global and regional left ventricular function is normal with an EF of 60-65%.  Right ventricular function, chamber size, wall motion, and thickness are  normal.  Right ventricular systolic pressure is 45mmHg above the right atrial pressure.  IVC diameter <2.1 cm collapsing >50% with sniff suggests a normal RA pressure  of 3 mmHg.  No pericardial effusion is present.  Previous study not available for comparison.    ECG 09/07/2020  The resting electrocardiogram: Normal sinus rhythm with non-specific ST-T   abnormalities.  NM cardiac MPI stress test 08/07/2020  The stress electrocardiogram: No diagnostic ECG evidence of ischemia when   compared to baseline ECG. No significant arrhythmias.    Extended Holter 08/2020:  FINDINGS:  1.  The patient's basic rhythm is sinus rhythm.    2.  PVCs were seen measuring less than 1% of total beats.  3.  Rare PACs were seen measuring less than 1% of total beats.  4.  0 diary entries were noted.  5.  4 triggered events were noted by the patient.  Triggered events correlated with sinus rhythm and on one  occasion sinus rhythm with PAC.  6.  There was one episode with complex ventricular ectopy consisting of 6 beats.  This was not reported as symptomatic by the patient.    Preop Questions 10/16/2020   1. Have you ever had a heart attack or stroke? YES - 10/15/2018 and 03/13/2020 he has 2 stents placed    2. Have you ever had surgery on your heart or blood vessels, such as a stent placement, a coronary artery bypass, or surgery on an artery in your head, neck, heart, or legs? YES - stent placement    3. Do you have chest pain with activity? No   4. Do you have a history of  heart failure? YES -    5. Do you currently have a cold, bronchitis or symptoms of other infection? No   6. Do you have a cough, shortness of breath, or wheezing? No   7. Do you or anyone in your family have previous history of blood clots? No   8. Do you or does anyone in your family have a serious bleeding problem such as prolonged bleeding following surgeries or cuts? No   9. Have you ever had problems with anemia or been told to take iron pills? No   10. Have you had any abnormal blood loss such as black, tarry or bloody stools? No   11. Have you ever had a blood transfusion? No   12. Are you willing to have a blood transfusion if it is medically needed before, during, or after your surgery? Yes   13. Have you or any of your relatives ever had problems with anesthesia? No   14. Do you have sleep apnea, excessive snoring or daytime drowsiness? No   15. Do you have any artifical heart valves or other implanted medical devices like a pacemaker, defibrillator, or continuous glucose monitor? No   16. Do you have artificial joints? No   17. Are you allergic to latex? No       Health Care Directive:  Patient does not have a Health Care Directive or Living Will: Discussed advance care planning with patient; however, patient declined at this time.    Preoperative Review of :   reviewed - no record of controlled substances prescribed.      Status  of Chronic Conditions:  CAD - Patient has a longstanding history of moderate-severe CAD. Patient denies recent chest pain or NTG use, denies exercise induced dyspnea or PND. L    COPD - Patient has a longstanding history of moderate-severe COPD . Patient has been doing well overall.      HYPERLIPIDEMIA - Patient has a long history of significant Hyperlipidemia requiring medication for treatment with recent good control. Patient reports no problems or side effects with the medication.     HYPERTENSION - Patient has longstanding history of HTN , currently denies any symptoms referable to elevated blood pressure. Specifically denies chest pain, palpitations, dyspnea, orthopnea, PND or peripheral edema. Blood pressure readings have been in normal range. Current medication regimen is as listed below. Patient denies any side effects of medication.       Review of Systems   Constitutional: Negative for activity change, appetite change, chills, diaphoresis, fatigue, fever and unexpected weight change.   HENT: Negative for congestion, rhinorrhea, sore throat and voice change.    Eyes: Negative for photophobia, pain, discharge, redness, itching and visual disturbance.   Respiratory: Positive for cough, shortness of breath and wheezing. Negative for choking, chest tightness and stridor.    Cardiovascular: Negative for chest pain, palpitations and leg swelling.   Gastrointestinal: Negative for abdominal distention, abdominal pain, blood in stool, diarrhea, nausea and vomiting.   Endocrine: Negative for cold intolerance and heat intolerance.   Genitourinary: Negative for dysuria, frequency, hematuria and urgency.   Musculoskeletal: Negative for arthralgias, back pain, gait problem, joint swelling, myalgias and neck pain.   Skin: Negative for color change, pallor, rash and wound.   Allergic/Immunologic: Negative for environmental allergies and food allergies.   Neurological: Negative for dizziness, tremors, seizures, speech  difficulty, weakness, light-headedness, numbness and headaches.   Hematological: Bruises/bleeds easily.   Psychiatric/Behavioral: Negative for agitation, hallucinations, sleep disturbance and suicidal ideas. The patient is not nervous/anxious.        There are no active problems to display for this patient.     Past Medical History:   Diagnosis Date     Adenocarcinoma, lung, right (H)      Coronary artery disease involving native coronary artery of native heart without angina pectoris      Dyslipidemia      Essential hypertension      Past Surgical History:   Procedure Laterality Date     LUNG SURGERY       Current Outpatient Medications   Medication Sig Dispense Refill     albuterol (PROAIR HFA/PROVENTIL HFA/VENTOLIN HFA) 108 (90 Base) MCG/ACT inhaler Inhale 2 puffs into the lungs every 6 hours 3 Inhaler 11     aspirin (ASA) 81 MG chewable tablet 81 mg by Nasogastric route daily       atorvastatin (LIPITOR) 80 MG tablet Take 80 mg by mouth       clopidogrel (PLAVIX) 75 MG tablet Take 75 mg by mouth daily       lisinopril (ZESTRIL) 10 MG tablet Take 10 mg by mouth       metoprolol succinate ER (TOPROL-XL) 50 MG 24 hr tablet Take 50 mg by mouth       nicotine (NICODERM CQ) 14 MG/24HR 24 hr patch Place 1 patch onto the skin every 24 hours 60 patch 11     nicotine (NICODERM CQ) 21 MG/24HR 24 hr patch Place 1 patch onto the skin every 24 hours 30 patch 3     nitroGLYcerin (NITROSTAT) 0.4 MG sublingual tablet Place 0.4 mg under the tongue daily       varenicline (CHANTIX CONTINUING MONTH HERMELINDA) 1 MG tablet Take 1 tablet (1 mg) by mouth 2 times daily 60 tablet 3     atorvastatin (LIPITOR) 40 MG tablet Take 40 mg by mouth daily       Fluticasone-Umeclidin-Vilanterol (TRELEGY ELLIPTA) 100-62.5-25 MCG/INH oral inhaler Inhale 1 puff into the lungs daily (Patient not taking: Reported on 9/21/2020) 3 Inhaler 11     levofloxacin (LEVAQUIN) 500 MG tablet Take 1 tablet (500 mg) by mouth daily (Patient not taking: Reported on  2020) 7 tablet 1     lisinopril (PRINIVIL/ZESTRIL) 5 MG tablet Take 5 mg by mouth daily       metoprolol succinate ER (TOPROL-XL) 25 MG 24 hr tablet Take 25 mg by mouth daily       predniSONE (DELTASONE) 20 MG tablet Take 2 tablets (40 mg) by mouth daily (Patient not taking: Reported on 2020) 5 tablet 1     rOPINIRole (REQUIP) 0.5 MG tablet Take 1 tablet (0.5 mg) by mouth At Bedtime (Patient not taking: Reported on 8/3/2020) 30 tablet 0       Allergies   Allergen Reactions     Bee Venom Anaphylaxis     BEE STINGS---told to carry epipen  Red ants stings     Penicillins Anaphylaxis and Shortness Of Breath     Oxycodone Other (See Comments) and Unknown     Mouth got blisters and bumps  Mouth got blisters and bumps          Social History     Tobacco Use     Smoking status: Former Smoker     Quit date: 10/7/2020     Years since quittin.0     Smokeless tobacco: Never Used   Substance Use Topics     Alcohol use: Not Currently     Frequency: Never     Comment: 31 years ago      Family History   Problem Relation Age of Onset     Cancer Mother      Heart Disease Father      History   Drug Use Unknown     Comment: quit 31 years ago         Objective     /78   Pulse 71   Temp 98.2  F (36.8  C) (Tympanic)   Wt 102.5 kg (226 lb)   SpO2 98%   BMI 37.61 kg/m      Physical Exam  Vitals signs and nursing note reviewed.   Constitutional:       General: He is not in acute distress.     Appearance: Normal appearance. He is not ill-appearing, toxic-appearing or diaphoretic.   HENT:      Head: Normocephalic and atraumatic.      Right Ear: Tympanic membrane normal.      Left Ear: Tympanic membrane normal.      Nose: No congestion.   Eyes:      Extraocular Movements: Extraocular movements intact.      Pupils: Pupils are equal, round, and reactive to light.   Neck:      Musculoskeletal: Normal range of motion and neck supple. No neck rigidity or muscular tenderness.   Cardiovascular:      Rate and Rhythm: Normal  rate and regular rhythm.      Heart sounds: No murmur. No friction rub. No gallop.    Pulmonary:      Effort: Pulmonary effort is normal. No respiratory distress.      Breath sounds: Normal breath sounds. No stridor. No wheezing, rhonchi or rales.   Chest:      Chest wall: No tenderness.   Abdominal:      General: Abdomen is flat. There is no distension.      Palpations: There is no mass.      Tenderness: There is no abdominal tenderness. There is no right CVA tenderness, left CVA tenderness or guarding.      Hernia: No hernia is present.   Musculoskeletal: Normal range of motion.         General: No swelling, tenderness, deformity or signs of injury.      Right lower leg: No edema.      Left lower leg: No edema.   Skin:     General: Skin is warm.      Capillary Refill: Capillary refill takes less than 2 seconds.      Coloration: Skin is not jaundiced or pale.      Findings: No bruising, erythema, lesion or rash.   Neurological:      General: No focal deficit present.      Mental Status: He is alert.         Recent Labs   Lab Test 08/01/19  1320   HGB 14.8           Diagnostics:  Labs pending at this time.  Results will be reviewed when available.   No EKG this visit, completed in the last 90 days.  CXR showed  :  IMPRESSION: Postoperative changes in the right lung. Stable linear  atelectasis in the lateral right lung and pleural thickening along the  right costophrenic sulcus. No focal infiltrate, pleural effusion or  pneumothorax. The cardiac and mediastinal silhouettes are normal. Old  healed right rib fractures presumably related to thoracotomy.  Revised Cardiac Risk Index (RCRI):  The patient has the following serious cardiovascular risks for perioperative complications:   - High risk surgery (>5% cardiac complication risk) = 1 point   - Coronary Artery Disease (MI, positive stress test, angina, Qs on EKG) = 1 point     RCRI Interpretation: 2 points: Class III (moderate risk - 6.6% complication rate)      Estimated Functional Capacity: Performs 4 METS exercise without symptoms (e.g., light housework, stairs, 4 mph walk, 7 mph bike, slow step dance)           Assessment & Plan   The proposed surgical procedure is considered HIGH risk.      ICD-10-CM    1. Preop general physical exam  Z01.818 CBC with platelets and differential     Comprehensive metabolic panel (BMP + Alb, Alk Phos, ALT, AST, Total. Bili, TP)     **A1C FUTURE 1yr     XR Chest 2 Views   2. PAD (peripheral artery disease) (H)  I73.9    3. Coronary artery disease involving native coronary artery of native heart without angina pectoris  I25.10           Risks and Recommendations:  The patient has the following additional risks and recommendations for perioperative complications:   - Consult Hospitalist / IM to assist with post-op medical management  Cardiovascular:   - Cardiology consulted ,spoke with cardiology at [facility]  And recommended to proceed with the procedure wit  no further testing   Pulmonary:    - Incentive spirometry post-op    Medication Instructions:  Patient is to take all scheduled medications on the day of surgery    RECOMMENDATION:  APPROVAL GIVEN to proceed with proposed procedure, without further diagnostic evaluation.    Signed Electronically by: Virgilio Ambrose MD    Copy of this evaluation report is provided to requesting physician.    Preop [facility] Preop Guidelines    Revised Cardiac Risk Index

## 2020-10-21 ENCOUNTER — TELEPHONE (OUTPATIENT)
Dept: FAMILY MEDICINE | Facility: CLINIC | Age: 60
End: 2020-10-21

## 2020-10-21 NOTE — TELEPHONE ENCOUNTER
Needs fax from [facility] with pt information and sign off.  Call back preferably by 3:15pm if possible

## 2020-10-22 ENCOUNTER — TELEPHONE (OUTPATIENT)
Dept: FAMILY MEDICINE | Facility: CLINIC | Age: 60
End: 2020-10-22

## 2020-10-22 NOTE — TELEPHONE ENCOUNTER
Hospital calling patient is scheduled for surgery tomorrow 10/23 need pre op and ekg report faxed as soon as possible to fax [phone] jessica Cleary

## 2020-10-22 NOTE — TELEPHONE ENCOUNTER
Pre-op & labs re faxed to [facility] # [phone].   Samantha Varela TC

## 2020-10-22 NOTE — TELEPHONE ENCOUNTER
Reason for Call:  Other preop information     Detailed comments: [facility] want preop faxed over ASAP. Patient surgery is tomorrow 10/23/2020.    Phone Number Patient can be reached at: Other phone number:  na    Best Time: asap    Can we leave a detailed message on this number? YES    Call taken on 10/22/2020 at 2:30 PM by Shar Luu

## 2020-11-09 ENCOUNTER — TRANSFERRED RECORDS (OUTPATIENT)
Dept: HEALTH INFORMATION MANAGEMENT | Facility: CLINIC | Age: 60
End: 2020-11-09

## 2021-01-09 ENCOUNTER — HEALTH MAINTENANCE LETTER (OUTPATIENT)
Age: 61
End: 2021-01-09

## 2021-03-08 ENCOUNTER — TRANSFERRED RECORDS (OUTPATIENT)
Dept: HEALTH INFORMATION MANAGEMENT | Facility: CLINIC | Age: 61
End: 2021-03-08

## 2021-04-07 ENCOUNTER — TRANSFERRED RECORDS (OUTPATIENT)
Dept: HEALTH INFORMATION MANAGEMENT | Facility: CLINIC | Age: 61
End: 2021-04-07

## 2021-10-04 ENCOUNTER — OFFICE VISIT (OUTPATIENT)
Dept: URGENT CARE | Facility: URGENT CARE | Age: 61
End: 2021-10-04
Payer: COMMERCIAL

## 2021-10-04 VITALS
OXYGEN SATURATION: 98 % | HEART RATE: 71 BPM | BODY MASS INDEX: 35.61 KG/M2 | SYSTOLIC BLOOD PRESSURE: 131 MMHG | WEIGHT: 214 LBS | RESPIRATION RATE: 16 BRPM | DIASTOLIC BLOOD PRESSURE: 74 MMHG | TEMPERATURE: 97.1 F

## 2021-10-04 DIAGNOSIS — J40 BRONCHITIS: Primary | ICD-10-CM

## 2021-10-04 PROCEDURE — 99214 OFFICE O/P EST MOD 30 MIN: CPT | Performed by: NURSE PRACTITIONER

## 2021-10-04 PROCEDURE — U0005 INFEC AGEN DETEC AMPLI PROBE: HCPCS | Performed by: NURSE PRACTITIONER

## 2021-10-04 PROCEDURE — U0003 INFECTIOUS AGENT DETECTION BY NUCLEIC ACID (DNA OR RNA); SEVERE ACUTE RESPIRATORY SYNDROME CORONAVIRUS 2 (SARS-COV-2) (CORONAVIRUS DISEASE [COVID-19]), AMPLIFIED PROBE TECHNIQUE, MAKING USE OF HIGH THROUGHPUT TECHNOLOGIES AS DESCRIBED BY CMS-2020-01-R: HCPCS | Performed by: NURSE PRACTITIONER

## 2021-10-04 RX ORDER — PREDNISONE 20 MG/1
40 TABLET ORAL DAILY
Qty: 10 TABLET | Refills: 0 | Status: SHIPPED | OUTPATIENT
Start: 2021-10-04 | End: 2021-10-09

## 2021-10-04 RX ORDER — CHOLECALCIFEROL (VITAMIN D3) 10(400)/ML
10000 DROPS ORAL DAILY
COMMUNITY

## 2021-10-04 RX ORDER — FAMOTIDINE 20 MG/1
20 TABLET, FILM COATED ORAL 2 TIMES DAILY
COMMUNITY

## 2021-10-04 RX ORDER — AZITHROMYCIN 250 MG/1
TABLET, FILM COATED ORAL
Qty: 6 TABLET | Refills: 0 | Status: SHIPPED | OUTPATIENT
Start: 2021-10-04 | End: 2021-10-09

## 2021-10-04 RX ORDER — MULTIVIT-MIN/IRON/FOLIC ACID/K 18-600-40
CAPSULE ORAL
COMMUNITY

## 2021-10-05 NOTE — PROGRESS NOTES
SUBJECTIVE:   Jerry Wren is a 61 year old male presenting with a chief complaint of cough  Onset of symptoms was 2 week(s) ago.  Course of illness is worsening.    Severity moderate  Current and Associated symptoms: runny nose and shortness of breath wheezing chest tightness  Has copd using inhaler  Treatment measures tried include OTC Cough med, Fluids and Rest.  Predisposing factors include None.    Past Medical History:   Diagnosis Date     Adenocarcinoma, lung, right (H)      Coronary artery disease involving native coronary artery of native heart without angina pectoris      Dyslipidemia      Essential hypertension      Current Outpatient Medications   Medication Sig Dispense Refill     albuterol (PROAIR HFA/PROVENTIL HFA/VENTOLIN HFA) 108 (90 Base) MCG/ACT inhaler Inhale 2 puffs into the lungs every 6 hours 3 Inhaler 11     Ascorbic Acid (VITAMIN C) 500 MG CAPS        aspirin (ASA) 81 MG chewable tablet 81 mg by Nasogastric route daily       atorvastatin (LIPITOR) 40 MG tablet Take 40 mg by mouth daily       atorvastatin (LIPITOR) 80 MG tablet Take 80 mg by mouth       azithromycin (ZITHROMAX) 250 MG tablet Take 2 tablets (500 mg) by mouth daily for 1 day, THEN 1 tablet (250 mg) daily for 4 days. 6 tablet 0     cholecalciferol (D-VI-SOL) 10 MCG/ML LIQD liquid Take 10,000 mcg by mouth daily       Coenzyme Q10-Vitamin E (QUNOL ULTRA COQ10 PO)        famotidine (PEPCID) 20 MG tablet Take 20 mg by mouth 2 times daily       lisinopril (PRINIVIL/ZESTRIL) 5 MG tablet Take 5 mg by mouth daily       lisinopril (ZESTRIL) 10 MG tablet Take 10 mg by mouth       metoprolol succinate ER (TOPROL-XL) 25 MG 24 hr tablet Take 25 mg by mouth daily       metoprolol succinate ER (TOPROL-XL) 50 MG 24 hr tablet Take 50 mg by mouth       nicotine (NICODERM CQ) 14 MG/24HR 24 hr patch Place 1 patch onto the skin every 24 hours 60 patch 11     nicotine (NICODERM CQ) 21 MG/24HR 24 hr patch Place 1 patch onto the skin every 24  hours 30 patch 3     nitroGLYcerin (NITROSTAT) 0.4 MG sublingual tablet Place 0.4 mg under the tongue daily       predniSONE (DELTASONE) 20 MG tablet Take 2 tablets (40 mg) by mouth daily for 5 days 10 tablet 0     Zinc 25 MG TABS        clopidogrel (PLAVIX) 75 MG tablet Take 75 mg by mouth daily (Patient not taking: Reported on 10/4/2021)       Fluticasone-Umeclidin-Vilanterol (TRELEGY ELLIPTA) 100-62.5-25 MCG/INH oral inhaler Inhale 1 puff into the lungs daily (Patient not taking: Reported on 2020) 3 Inhaler 11     levofloxacin (LEVAQUIN) 500 MG tablet Take 1 tablet (500 mg) by mouth daily (Patient not taking: Reported on 2020) 7 tablet 1     predniSONE (DELTASONE) 20 MG tablet Take 2 tablets (40 mg) by mouth daily (Patient not taking: Reported on 2020) 5 tablet 1     rOPINIRole (REQUIP) 0.5 MG tablet Take 1 tablet (0.5 mg) by mouth At Bedtime (Patient not taking: Reported on 8/3/2020) 30 tablet 0     varenicline (CHANTIX CONTINUING MONTH HERMELINDA) 1 MG tablet Take 1 tablet (1 mg) by mouth 2 times daily (Patient not taking: Reported on 10/4/2021) 60 tablet 3     Social History     Tobacco Use     Smoking status: Former Smoker     Quit date: 10/7/2020     Years since quittin.9     Smokeless tobacco: Never Used   Substance Use Topics     Alcohol use: Not Currently     Comment: 31 years ago        ROS:  Review of systems negative except as stated above.    OBJECTIVE:  /74 (BP Location: Right arm, Patient Position: Sitting, Cuff Size: Adult Large)   Pulse 71   Temp 97.1  F (36.2  C) (Tympanic)   Resp 16   Wt 97.1 kg (214 lb)   SpO2 98%   BMI 35.61 kg/m    GENERAL APPEARANCE: healthy, alert and no distress  EYES: EOMI,  PERRL, conjunctiva clear  HENT: ear canals and TM's normal.  Nose and mouth without ulcers, erythema or lesions  NECK: supple, nontender, no lymphadenopathy  RESP: lungs clear to auscultation. Wheezes throughout  CV: regular rates and rhythm, normal S1 S2, no murmur  noted  SKIN: no suspicious lesions or rashes    ASSESSMENT:  (J40) Bronchitis  (primary encounter diagnosis)    Plan: azithromycin (ZITHROMAX) 250 MG tablet,         predniSONE (DELTASONE) 20 MG tablet,         Symptomatic COVID-19 Virus (Coronavirus) by PCR isolate as discussed pending results  Home treat and monitor symptoms call if new or worsening  To ER if emergent as discussed      CHANDANA Nuñez CNP

## 2021-10-06 LAB — SARS-COV-2 RNA RESP QL NAA+PROBE: NEGATIVE

## 2021-10-23 ENCOUNTER — HEALTH MAINTENANCE LETTER (OUTPATIENT)
Age: 61
End: 2021-10-23

## 2022-02-12 ENCOUNTER — HEALTH MAINTENANCE LETTER (OUTPATIENT)
Age: 62
End: 2022-02-12

## 2022-06-13 ENCOUNTER — TELEPHONE (OUTPATIENT)
Dept: PULMONOLOGY | Facility: CLINIC | Age: 62
End: 2022-06-13

## 2022-06-13 ENCOUNTER — OFFICE VISIT (OUTPATIENT)
Dept: PULMONOLOGY | Facility: CLINIC | Age: 62
End: 2022-06-13
Payer: COMMERCIAL

## 2022-06-13 ENCOUNTER — ANCILLARY PROCEDURE (OUTPATIENT)
Dept: CT IMAGING | Facility: CLINIC | Age: 62
End: 2022-06-13
Payer: COMMERCIAL

## 2022-06-13 VITALS
WEIGHT: 208.7 LBS | OXYGEN SATURATION: 98 % | BODY MASS INDEX: 34.73 KG/M2 | HEART RATE: 67 BPM | DIASTOLIC BLOOD PRESSURE: 70 MMHG | SYSTOLIC BLOOD PRESSURE: 117 MMHG

## 2022-06-13 DIAGNOSIS — J44.9 CHRONIC OBSTRUCTIVE PULMONARY DISEASE, UNSPECIFIED COPD TYPE (H): ICD-10-CM

## 2022-06-13 DIAGNOSIS — C34.91 MALIGNANT NEOPLASM OF RIGHT LUNG, UNSPECIFIED PART OF LUNG (H): Primary | ICD-10-CM

## 2022-06-13 DIAGNOSIS — Z72.0 TOBACCO ABUSE: Primary | ICD-10-CM

## 2022-06-13 PROCEDURE — 71250 CT THORAX DX C-: CPT | Performed by: RADIOLOGY

## 2022-06-13 PROCEDURE — 99214 OFFICE O/P EST MOD 30 MIN: CPT | Performed by: INTERNAL MEDICINE

## 2022-06-13 RX ORDER — ALBUTEROL SULFATE 90 UG/1
2 AEROSOL, METERED RESPIRATORY (INHALATION) EVERY 6 HOURS
Qty: 18 G | Refills: 11 | Status: SHIPPED | OUTPATIENT
Start: 2022-06-13 | End: 2023-01-23

## 2022-06-13 NOTE — TELEPHONE ENCOUNTER
[facility] Call Center    Phone Message    May a detailed message be left on voicemail: yes     Reason for Call: The patient stated he had a visit with Dr. Rodriguez today 6/13/2022 and he was supposed to have two prescriptions sent to his pharmacy.  The pharmacy is advising that only they did not receive a prescription for the Nicotrol Inhaler.      [facility] - [place], [place] - [address]    Action Taken: Message routed to:  Adult Clinics: Pulmonology p [id number]    Travel Screening: Not Applicable

## 2022-06-13 NOTE — NURSING NOTE
"Jerry Wren's goals for this visit include:   Chief Complaint   Patient presents with     Pulmonary Nodule     Follow-up, CT prior to appointment     Smoking Cessation     Would like to discuss quit smoking       PCP: Jenn Vazquez    Referring Provider:  No referring provider defined for this encounter.      Initial /70 (BP Location: Left arm, Patient Position: Sitting, Cuff Size: Adult Regular)   Pulse 67   Wt 94.7 kg (208 lb 11.2 oz)   SpO2 98%   BMI 34.73 kg/m   Estimated body mass index is 34.73 kg/m  as calculated from the following:    Height as of 8/3/20: 1.651 m (5' 5\").    Weight as of this encounter: 94.7 kg (208 lb 11.2 oz).    Medication Reconciliation: complete    Do you need any medication refills at today's visit? YES    MARCO Guevara  Rheumatology/Infectious disease  [facility]   [phone]    "

## 2022-06-13 NOTE — PROGRESS NOTES
LUNG NODULE & INTERVENTIONAL PULMONARY CLINIC  CL[facility], [facility]     Jerry Wren MRN# [id number]   Age: 62 year old YOB: 1960     Reason for Consultation: lung nodule(s)       Assessment and Plan:    1. COPD. Mild obstruction post-lobectomy. He does use his inhalers.      2. Tobacco. Cont to smoke 1ppd. He has tried NRT and chantix in the past. He thinks it would be easier to quit if his wife quits also.      3. NSCLC s/p RUL lobectomy. No acute findings on today's CT. Will need close surveillance for next 2-3yrs then annual CT. Next CT in 6mo.     Billing: I spent 30-39min (Established, 61737) on the date of the encounter doing chart review, history and exam, documentation and further activities as described in this note.    Norberto Rodriguez MD   of Medicine  Interventional Pulmonology  Department of Pulmonary, Allergy, Critical Care and Sleep Medicine   [facility]  Pager: [phone]          History:     Jerry Wren is a 59 year old male with sig h/o for NSCLC and CAD  who is here for evaluation/followup of lung nodule(s).     - No new resp sx. Says breathing has progressively gotten worse.    - Here for f/u nodules and sx   - Personal hx of cancer: NSCLC. Up-to-date on c-scope    - Family hx of cancer: Mom  of lung cancer.   - Exposure hx: Denies asbestos or radon exposure   - Tobacco hx: Current Smoker: 2.5ppd since 16yo and down to 1ppd with chantix. Still smoking 1ppd   - My interpretation of the images relevant for this visit includes: nodules, pleural effusion   - My interpretation of the PFT's relevant for this visit includes: Mild obstruction (3/26/15) at [facility]      Culprit Nodule(s):   1: Left lower lobe nodule and is 6 mm in size/severity and ground glass in morphology/quality. First seen by chest CT on 19. First observed on this date. Not visualized on  today's CT  2. Pleural effusion is resolved     Other active medical problems include:   - had NSCLC (Stage 1) s/p RUL lobectomy 18. Surveillance CT today with no new nodules.   - had CAD s/p PCI x2 in 2019. On plavix. Also has PAD and getting worked up for PCI.    - has COPD. On incruse ellipta and prn albuterol. No recent ED visits. Up-to-date on PNA and flu vaccine. Recent covid negative.         Past Medical History:      Past Medical History:   Diagnosis Date     Adenocarcinoma, lung, right (H)      Coronary artery disease involving native coronary artery of native heart without angina pectoris      Dyslipidemia      Essential hypertension            Past Surgical History:      Past Surgical History:   Procedure Laterality Date     LUNG SURGERY            Social History:     Social History     Tobacco Use     Smoking status: Former Smoker     Quit date: 10/7/2020     Years since quittin.6     Smokeless tobacco: Never Used   Substance Use Topics     Alcohol use: Not Currently     Comment: 31 years ago           Family History:     Family History   Problem Relation Age of Onset     Cancer Mother      Heart Disease Father            Allergies:      Allergies   Allergen Reactions     Bee Venom Anaphylaxis     BEE STINGS---told to carry epipen  Red ants stings     Penicillins Anaphylaxis and Shortness Of Breath     Oxycodone Other (See Comments) and Unknown     Mouth got blisters and bumps  Mouth got blisters and bumps            Medications:     Current Outpatient Medications   Medication Sig     albuterol (PROAIR HFA/PROVENTIL HFA/VENTOLIN HFA) 108 (90 Base) MCG/ACT inhaler Inhale 2 puffs into the lungs every 6 hours     Ascorbic Acid (VITAMIN C) 500 MG CAPS      aspirin (ASA) 81 MG chewable tablet 81 mg by Nasogastric route daily     atorvastatin (LIPITOR) 80 MG tablet Take 80 mg by mouth     cholecalciferol (D-VI-SOL) 10 MCG/ML LIQD liquid Take 10,000 mcg by mouth daily     Coenzyme Q10-Vitamin E (QUNOL  ULTRA COQ10 PO)      famotidine (PEPCID) 20 MG tablet Take 20 mg by mouth 2 times daily     lisinopril (PRINIVIL/ZESTRIL) 5 MG tablet Take 5 mg by mouth daily     metoprolol succinate ER (TOPROL-XL) 25 MG 24 hr tablet Take 25 mg by mouth daily     metoprolol succinate ER (TOPROL-XL) 50 MG 24 hr tablet Take 50 mg by mouth     nicotine (NICODERM CQ) 14 MG/24HR 24 hr patch Place 1 patch onto the skin every 24 hours     nitroGLYcerin (NITROSTAT) 0.4 MG sublingual tablet Place 0.4 mg under the tongue daily     atorvastatin (LIPITOR) 40 MG tablet Take 40 mg by mouth daily (Patient not taking: Reported on 6/13/2022)     clopidogrel (PLAVIX) 75 MG tablet Take 75 mg by mouth daily (Patient not taking: No sig reported)     Fluticasone-Umeclidin-Vilanterol (TRELEGY ELLIPTA) 100-62.5-25 MCG/INH oral inhaler Inhale 1 puff into the lungs daily (Patient not taking: No sig reported)     levofloxacin (LEVAQUIN) 500 MG tablet Take 1 tablet (500 mg) by mouth daily (Patient not taking: No sig reported)     lisinopril (ZESTRIL) 10 MG tablet Take 10 mg by mouth (Patient not taking: Reported on 6/13/2022)     nicotine (NICODERM CQ) 21 MG/24HR 24 hr patch Place 1 patch onto the skin every 24 hours (Patient not taking: Reported on 6/13/2022)     predniSONE (DELTASONE) 20 MG tablet Take 2 tablets (40 mg) by mouth daily (Patient not taking: No sig reported)     rOPINIRole (REQUIP) 0.5 MG tablet Take 1 tablet (0.5 mg) by mouth At Bedtime (Patient not taking: No sig reported)     varenicline (CHANTIX CONTINUING MONTH HERMELINDA) 1 MG tablet Take 1 tablet (1 mg) by mouth 2 times daily (Patient not taking: No sig reported)     Zinc 25 MG TABS  (Patient not taking: Reported on 6/13/2022)     No current facility-administered medications for this visit.          Review of Systems:     CONSTITUTIONAL: negative for fever, chills, change in weight  INTEGUMENTARY/SKIN: no rash or obvious new lesions  ENT/MOUTH: no sore throat, new sinus pain or nasal  drainage  RESP: see interval history  CV: negative for chest pain, palpitations or peripheral edema  GI: no nausea, vomiting, change in stools  : no dysuria  MUSCULOSKELETAL: no myalgias, arthralgias  ENDOCRINE: blood sugars with adequate control  PSYCHIATRIC: mood stable  LYMPHATIC: no new lymphadenopathy  HEME: no bleeding or easy bruisability  NEURO: no numbness, weakness, headaches         Physical Exam:     Constitutional - looks well, in no apparent distress  Respiratory -breathing appears comfortable.   Skin - No appreciable discoloration or lesions (very limited exam)  Neurological - No apparent tremors. Speech fluent and articlate          Current Laboratory Data:   All laboratory and imaging data reviewed.    No results found for this or any previous visit (from the past 24 hour(s)).         Previous Chest Imaging   No images are attached to the encounter.  No images are attached to the encounter or orders placed in the encounter.         Previous Cardiology Imaging   No results found for this or any previous visit (from the past 8760 hour(s)).

## 2022-06-14 DIAGNOSIS — J44.9 CHRONIC OBSTRUCTIVE PULMONARY DISEASE, UNSPECIFIED COPD TYPE (H): Primary | ICD-10-CM

## 2022-06-14 NOTE — TELEPHONE ENCOUNTER
Sent in nicitrol per Dr. Rodriguez. Patient will call back in a month if he finds that is is helping him quit smoking.     Cassandra Doan RN

## 2022-06-20 DIAGNOSIS — Z71.6 ENCOUNTER FOR SMOKING CESSATION COUNSELING: ICD-10-CM

## 2022-06-20 DIAGNOSIS — Z72.0 TOBACCO ABUSE: Primary | ICD-10-CM

## 2022-06-20 NOTE — CONFIDENTIAL NOTE
[facility] pharmacy calling in requesting update on nicotrol inhaler prior authorization?   No prior authorization noted in chart.

## 2022-06-20 NOTE — TELEPHONE ENCOUNTER
Prior Authorization Retail Medication Request    Medication/Dose: nicotine (NICOTROL) 10 MG inhaler    ICD code (if different than what is on RX):    Previously Tried and Failed:    Rationale:      Insurance Name:    Insurance ID:        Pharmacy Information (if different than what is on RX)  Name:    Phone:

## 2022-06-20 NOTE — TELEPHONE ENCOUNTER
Central Prior Authorization Team   Phone: [phone]      PA Initiation    Medication: nicotine (NICOTROL) 10 MG inhaler-PA INITIATED  Insurance Company: [employer] ([facility]) - Phone [phone] Fax [phone]  Pharmacy Filling the Rx: [facility] - [place], [place] - [address]  Filling Pharmacy Phone: [phone]  Filling Pharmacy Fax:    Start Date: 6/20/2022

## 2022-06-22 NOTE — TELEPHONE ENCOUNTER
PRIOR AUTHORIZATION DENIED    Medication: nicotine (NICOTROL) 10 MG inhaler-PA denied    Denial Date: 6/21/2022    Denial Rational: must try/fail bupropion        Appeal Information:

## 2022-06-23 NOTE — TELEPHONE ENCOUNTER
Contacted patient regarding Nicotrol PA denial. Voice mail left for patient informing him of denial. Requested a call back to pulmonary clinic if interested in trying Bupropion, as this is what is recommended through patient's insurance.    Carla Mann LPN  Pulmonary Medicine:  [facility]  Phone: [phone] Fax: [phone]

## 2022-06-23 NOTE — TELEPHONE ENCOUNTER
Patient returned call back to pulmonary clinic regarding Bupropion. Patient is interested in starting this medicine for smoking cessation. Patient requesting that Rx be sent to the [facility] in [place].    Carla Mann LPN  Pulmonary Medicine:  [facility]  Phone: [phone] Fax: [phone]

## 2022-06-24 RX ORDER — BUPROPION HYDROCHLORIDE 150 MG/1
TABLET, FILM COATED, EXTENDED RELEASE ORAL
Qty: 143 TABLET | Refills: 0 | Status: SHIPPED | OUTPATIENT
Start: 2022-06-24 | End: 2022-09-05

## 2022-10-09 ENCOUNTER — HEALTH MAINTENANCE LETTER (OUTPATIENT)
Age: 62
End: 2022-10-09

## 2022-11-21 RX ORDER — BUPROPION HYDROCHLORIDE 150 MG/1
TABLET, EXTENDED RELEASE ORAL
Qty: 143 TABLET | Refills: 0 | OUTPATIENT
Start: 2022-11-21

## 2022-11-21 NOTE — TELEPHONE ENCOUNTER
Rx refill request received for buPROPion (ZYBAN) 150 MG 12 hr tablet. This is a one time treatment for smoking cessation and not to be refilled. Rx refill has been denied as patient is currently using nicotine (NICOTROL) 10 MG inhaler.      Carla Mann LPN  Pulmonary Medicine:  [facility]  Phone: [phone] Fax: [phone]

## 2022-12-15 NOTE — TELEPHONE ENCOUNTER
FUTURE VISIT INFORMATION      FUTURE VISIT INFORMATION:    Date: 4/11/23    Time: 3pm    Location: [facility]  REFERRAL INFORMATION:    Referring provider:      Referring providers clinic:      Reason for visit/diagnosis  pt scheduling voice problem and has had Pulmonary nodule ,mpls location verified, med rec in [facility]. patient requesting to only see ent scheduled per voice/throat decison tree.    RECORDS REQUESTED FROM:       Clinic name Comments Records Status Imaging Status   [facility] 6/13/22- note with Norberto Rodriguez MD  11/11/19- PFT    [facility]     Imaging  12/26/22- CT Chest   6/13/22- CT Chest  [facility]  Pacs

## 2022-12-26 ENCOUNTER — ANCILLARY PROCEDURE (OUTPATIENT)
Dept: CT IMAGING | Facility: CLINIC | Age: 62
End: 2022-12-26
Attending: INTERNAL MEDICINE
Payer: COMMERCIAL

## 2022-12-26 DIAGNOSIS — C34.91 MALIGNANT NEOPLASM OF RIGHT LUNG, UNSPECIFIED PART OF LUNG (H): ICD-10-CM

## 2022-12-26 DIAGNOSIS — J44.9 CHRONIC OBSTRUCTIVE PULMONARY DISEASE, UNSPECIFIED COPD TYPE (H): ICD-10-CM

## 2022-12-26 PROCEDURE — 71250 CT THORAX DX C-: CPT | Mod: GC | Performed by: RADIOLOGY

## 2023-01-23 ENCOUNTER — VIRTUAL VISIT (OUTPATIENT)
Dept: PULMONOLOGY | Facility: CLINIC | Age: 63
End: 2023-01-23
Payer: COMMERCIAL

## 2023-01-23 DIAGNOSIS — R91.8 PULMONARY NODULES: ICD-10-CM

## 2023-01-23 DIAGNOSIS — C34.90 MALIGNANT NEOPLASM OF LUNG, UNSPECIFIED LATERALITY, UNSPECIFIED PART OF LUNG (H): Primary | ICD-10-CM

## 2023-01-23 DIAGNOSIS — J44.9 CHRONIC OBSTRUCTIVE PULMONARY DISEASE, UNSPECIFIED COPD TYPE (H): ICD-10-CM

## 2023-01-23 PROCEDURE — 99215 OFFICE O/P EST HI 40 MIN: CPT | Mod: GT | Performed by: INTERNAL MEDICINE

## 2023-01-23 RX ORDER — ALBUTEROL SULFATE 90 UG/1
2 AEROSOL, METERED RESPIRATORY (INHALATION) EVERY 6 HOURS
Qty: 18 G | Refills: 11 | Status: SHIPPED | OUTPATIENT
Start: 2023-01-23 | End: 2023-04-23

## 2023-01-23 NOTE — PROGRESS NOTES
"LUNG NODULE & INTERVENTIONAL PULMONARY CLINIC  CLINICS & SURGERY CENTER, [facility], [facility]   VIDEO VISIT    Jerry Wren MRN# [id number]   Age: 62 year old YOB: 1960     Reason for Consultation: Lung Nodule    Requesting Physician: No referring provider defined for this encounter.       The patient has been notified of following:   \"This video visit will be conducted via a call between you and your physician/provider. We have found that certain health care needs can be provided without the need for an in-person physical exam.  This service lets us provide the care you need with a video conversation.  If a prescription is necessary we can send it directly to your pharmacy.  If lab work is needed we can place an order for that and you can then stop by our lab to have the test done at a later time.  Video visits are billed at different rates depending on your insurance coverage.  Please reach out to your insurance provider with any questions.  If during the course of the call the physician/provider feels a video visit is not appropriate, you will not be charged for this service.\"  Patient has given verbal consent for Video visit? Yes  How would you like to obtain your AVS? Please refer to rooming staff note  Patient would like the video invitation sent by: Please refer to rooming staff note   Will anyone else be joining your video visit? Please refer to rooming staff note       Video-Visit Details     Type of service:  Video Visit  Video Start Time: 1000  Video End Time: 1020  Provider Name: Norberto Rodriguez MD, A   Originating Location (pt. Location): Home  Provider Location: Off campus   Distant Location (provider location): Home/Clinic  Platform used for Video Visit: Jason/Danya    Assessment and Plan:    1. COPD. Mild obstruction post-lobectomy. He does use his inhalers. Re-filled albuterol today     2. Tobacco. Cont to smoke 1ppd. He has tried NRT and " chantix in the past. He thinks it would be easier to quit if his wife quits also.      3. NSCLC s/p RUL lobectomy. Last CT in 2022 which showed some subtle ggo's. This was done due to URI/cough. Will repeat CT in March for followup.     CT in March with clinic visit    Billing: I spent a total of 45min spent on date of encounter which includes prep time, visit with the patient and post visit work including documentation and nursing communication     Norberto Rodriguez MD, MHA  Associate Professor of Medicine  Section of Interventional Pulmonology   Division of Pulmonary, Allergy, Critical Care and Sleep Medicine   [facility]  Pager: [phone]   Office: [phone]  Email: [email or web address]           History:     Jerry Wren is a 59 year old male with sig h/o for NSCLC and CAD  who is here for evaluation/followup of lung nodule(s).     - No new resp sx. Had URI in December including voice hoarseness. Has improved today  - Here for f/u nodules and sx   - Personal hx of cancer: NSCLC. Up-to-date on c-scope    - Family hx of cancer: Mom  of lung cancer.   - Exposure hx: Denies asbestos or radon exposure   - Tobacco hx: Current Smoker: 2.5ppd since 14yo and down to 1ppd with chantix. Still smoking 1ppd   - My interpretation of the images relevant for this visit includes: nodules, pleural effusion   - My interpretation of the PFT's relevant for this visit includes: Mild obstruction (3/26/15) at [facility]      Culprit Nodule(s):   1: Left lower lobe nodule and is 6 mm in size/severity and ground glass in morphology/quality. First seen by chest CT on 19. First observed on this date. Not visualized on today's CT  2. Pleural effusion is resolved     Other active medical problems include:   - had NSCLC (Stage 1) s/p RUL lobectomy 18. Surveillance CT today with no new nodules.   - had CAD s/p PCI x2 in 2019. On plavix. Also has PAD and getting worked up for PCI.    - has COPD. On  incruse ellipta and prn albuterol. No recent ED visits. Up-to-date on PNA and flu vaccine. Recent covid negative.         Past Medical History:      Past Medical History:   Diagnosis Date     Adenocarcinoma, lung, right (H)      Coronary artery disease involving native coronary artery of native heart without angina pectoris      Dyslipidemia      Essential hypertension              Past Surgical History:      Past Surgical History:   Procedure Laterality Date     LUNG SURGERY              Social History:     Social History     Tobacco Use     Smoking status: Every Day     Types: Cigarettes     Smokeless tobacco: Never   Substance Use Topics     Alcohol use: Not Currently     Comment: 31 years ago             Family History:     Family History   Problem Relation Age of Onset     Cancer Mother      Heart Disease Father              Allergies:      Allergies   Allergen Reactions     Bee Venom Anaphylaxis     BEE STINGS---told to carry epipen  Red ants stings     Penicillins Anaphylaxis and Shortness Of Breath     Oxycodone Other (See Comments) and Unknown     Mouth got blisters and bumps  Mouth got blisters and bumps              Medications:     Current Outpatient Medications   Medication Sig     albuterol (PROAIR HFA/PROVENTIL HFA/VENTOLIN HFA) 108 (90 Base) MCG/ACT inhaler Inhale 2 puffs into the lungs every 6 hours     Ascorbic Acid (VITAMIN C) 500 MG CAPS      aspirin (ASA) 81 MG chewable tablet 81 mg by Nasogastric route daily     cholecalciferol (D-VI-SOL) 10 MCG/ML LIQD liquid Take 10,000 mcg by mouth daily     clopidogrel (PLAVIX) 75 MG tablet Take 75 mg by mouth daily     Coenzyme Q10-Vitamin E (QUNOL ULTRA COQ10 PO)      famotidine (PEPCID) 20 MG tablet Take 20 mg by mouth 2 times daily     lisinopril (PRINIVIL/ZESTRIL) 5 MG tablet Take 5 mg by mouth daily     metoprolol succinate ER (TOPROL-XL) 25 MG 24 hr tablet Take 25 mg by mouth daily     metoprolol succinate ER (TOPROL-XL) 50 MG 24 hr tablet Take 50 mg  by mouth     nicotine (NICODERM CQ) 21 MG/24HR 24 hr patch Place 1 patch onto the skin every 24 hours     nitroGLYcerin (NITROSTAT) 0.4 MG sublingual tablet Place 0.4 mg under the tongue daily     Zinc 25 MG TABS      atorvastatin (LIPITOR) 40 MG tablet Take 40 mg by mouth daily (Patient not taking: Reported on 1/23/2023)     atorvastatin (LIPITOR) 80 MG tablet Take 80 mg by mouth (Patient not taking: Reported on 1/23/2023)     Fluticasone-Umeclidin-Vilanterol (TRELEGY ELLIPTA) 100-62.5-25 MCG/INH oral inhaler Inhale 1 puff into the lungs daily (Patient not taking: Reported on 9/21/2020)     levofloxacin (LEVAQUIN) 500 MG tablet Take 1 tablet (500 mg) by mouth daily (Patient not taking: Reported on 9/21/2020)     lisinopril (ZESTRIL) 10 MG tablet Take 10 mg by mouth (Patient not taking: Reported on 6/13/2022)     nicotine (NICODERM CQ) 14 MG/24HR 24 hr patch Place 1 patch onto the skin every 24 hours (Patient not taking: Reported on 1/23/2023)     nicotine (NICOTROL) 10 MG inhaler Use 1 cartridge as needed for urge to smoke by puffing over course of 20min.  Use 6-16 cart/day; reduce number of cart/day over 6-12 weeks. (Patient not taking: Reported on 1/23/2023)     nicotine (NICOTROL) 10 MG inhaler Use 1 cartridge as needed for urge to smoke by puffing over course of 20min.  Use 6-16 cart/day; reduce number of cart/day over 6-12 weeks. (Patient not taking: Reported on 1/23/2023)     predniSONE (DELTASONE) 20 MG tablet Take 2 tablets (40 mg) by mouth daily (Patient not taking: Reported on 9/21/2020)     rOPINIRole (REQUIP) 0.5 MG tablet Take 1 tablet (0.5 mg) by mouth At Bedtime (Patient not taking: Reported on 8/3/2020)     varenicline (CHANTIX CONTINUING MONTH HERMELINDA) 1 MG tablet Take 1 tablet (1 mg) by mouth 2 times daily (Patient not taking: Reported on 10/4/2021)     No current facility-administered medications for this visit.            Review of Systems:     CONSTITUTIONAL: negative for fever, chills, change in  weight  INTEGUMENTARY/SKIN: no rash or obvious new lesions  ENT/MOUTH: no sore throat, new sinus pain or nasal drainage  RESP: see interval history  CV: negative for chest pain, palpitations or peripheral edema  GI: no nausea, vomiting, change in stools  : no dysuria  MUSCULOSKELETAL: no myalgias, arthralgias  ENDOCRINE: blood sugars with adequate control  PSYCHIATRIC: mood stable  LYMPHATIC: no new lymphadenopathy  HEME: no bleeding or easy bruisability  NEURO: no numbness, weakness, headaches         Physical Exam:     Constitutional - looks well, in no apparent distress  Eyes - no redness or discharge  Respiratory -breathing appears comfortable.  No cough.  Skin - No appreciable discoloration or lesions (very limited exam)  Neurological - No apparent tremors. Speech fluent and articlate  Psychiatric - no signs of delirium or anxiety     Exam limited to that easily identified on a virtual visit. The rest of a comprehensive physical examination is deferred due to [facility] (public health emergency) video visit restrictions.         Current Laboratory Data:   All laboratory and imaging data reviewed.           Previous Cardiology Imaging   No results found for this or any previous visit (from the past 8760 hour(s)).

## 2023-01-23 NOTE — PROGRESS NOTES
Jerry is a 62 year old who is being evaluated via a billable video visit.      How would you like to obtain your AVS? GoMileshart  If the video visit is dropped, the invitation should be resent by: Text to cell phone: [phone]  Will anyone else be joining your video visit? No    See note

## 2023-03-25 ENCOUNTER — HEALTH MAINTENANCE LETTER (OUTPATIENT)
Age: 63
End: 2023-03-25

## 2023-04-11 ENCOUNTER — PRE VISIT (OUTPATIENT)
Dept: OTOLARYNGOLOGY | Facility: CLINIC | Age: 63
End: 2023-04-11

## 2023-06-03 ENCOUNTER — TELEPHONE (OUTPATIENT)
Dept: OTOLARYNGOLOGY | Facility: CLINIC | Age: 63
End: 2023-06-03
Payer: COMMERCIAL

## 2023-08-16 NOTE — TELEPHONE ENCOUNTER
FUTURE VISIT INFORMATION        FUTURE VISIT INFORMATION:  Date: 11/14/23  Time: 9 AM  Location: [facility]  REFERRAL INFORMATION:  Referring provider:    Referring providers clinic:    Reason for visit/diagnosis  r/s from 8/25 New per pt, scheduling for voice problem-gravely voice, has had Pulmonary nodule ,mpls location verified, med rec in [facility]. patient requesting to only see ent scheduled per voice/throat decison tree.      RECORDS REQUESTED FROM:         Clinic name Comments Records Status Imaging Status   [facility] 6/13/22- note with Norberto Rodriguez MD  11/11/19- PFT     Epic      Imaging  12/26/22- CT Chest   6/13/22- CT Chest  [facility]  Pacs   [facility]  10/23/23- OV Moritz, Bryan McCoy, PA    Images:  10/23/23- XR Chest    Care everywhere  11/3/23-  Pending   PACS     November 3, 2023 9:38 AM - Faxed a request to [facility] to push Image to [place] PACS- Yulissa   November 4, 2023 11:05 AM - image resolved in pacs -Myriam

## 2023-10-10 ENCOUNTER — OFFICE VISIT (OUTPATIENT)
Dept: URGENT CARE | Facility: URGENT CARE | Age: 63
End: 2023-10-10
Payer: COMMERCIAL

## 2023-10-10 VITALS
DIASTOLIC BLOOD PRESSURE: 80 MMHG | OXYGEN SATURATION: 98 % | TEMPERATURE: 97.4 F | WEIGHT: 213.4 LBS | BODY MASS INDEX: 35.51 KG/M2 | RESPIRATION RATE: 18 BRPM | SYSTOLIC BLOOD PRESSURE: 126 MMHG | HEART RATE: 77 BPM

## 2023-10-10 DIAGNOSIS — H53.9 VISUAL DISTURBANCE: ICD-10-CM

## 2023-10-10 DIAGNOSIS — R42 LIGHTHEADEDNESS: ICD-10-CM

## 2023-10-10 DIAGNOSIS — I25.2 HISTORY OF MI (MYOCARDIAL INFARCTION): ICD-10-CM

## 2023-10-10 DIAGNOSIS — R07.9 CHEST PAIN, UNSPECIFIED TYPE: ICD-10-CM

## 2023-10-10 DIAGNOSIS — Z85.118 HISTORY OF LUNG CANCER: ICD-10-CM

## 2023-10-10 DIAGNOSIS — R06.02 SHORTNESS OF BREATH: Primary | ICD-10-CM

## 2023-10-10 PROCEDURE — 99214 OFFICE O/P EST MOD 30 MIN: CPT | Performed by: NURSE PRACTITIONER

## 2023-10-10 NOTE — PROGRESS NOTES
Assessment & Plan     Shortness of breath    Chest pain, unspecified type    Lightheadedness    History of lung cancer    Visual disturbance    History of MI (myocardial infarction)       Recommend further evaluation in emergency room for chest pain with shortness of breath, lightheadedness, seeing spots with cough with history of 4 MI, lung cancer as cannot rule out MI in urgent care. Patient declines ambulance and is discharged in stable condition.       Loan Jaime NP  [facility] [place]    Andre Edwards is a 63 year old male who presents to clinic today for the following health issues:  Chief Complaint   Patient presents with    Cough     Cold sx x2-3 weeks  Hx of lung cancer, portion of right lung removed   Light headed, trouble breathing when coughing   Tired  4 heart attacks      Patient presents for evaluation of shortness of breath. Associated symptoms: chest pain, light headedness, fatigue, cough. Cough is dry- he hasn't been able to cough mucus out. Symptoms have been present for 2-3 weeks and have been worsening. Shortness of breath gets severe especially when coughing. He has a history of 4 heart attacks and lung cancer and has had part of right lung removed, CAD. Denies fever. He smokes 1 ppd. He states he was gasping for air which scared him.     Problem list, Medication list, Allergies, and Medical history reviewed in EPIC.    ROS:  Review of systems negative except for noted above        Objective    /80   Pulse 77   Temp 97.4  F (36.3  C) (Tympanic)   Resp 18   Wt 96.8 kg (213 lb 6.4 oz)   SpO2 98%   BMI 35.51 kg/m    Physical Exam  Constitutional:       General: He is not in acute distress.     Appearance: He is not toxic-appearing or diaphoretic.   HENT:      Head: Normocephalic and atraumatic.   Cardiovascular:      Rate and Rhythm: Normal rate and regular rhythm.      Heart sounds: Normal heart sounds.   Pulmonary:      Effort: No respiratory  distress.      Breath sounds: No wheezing, rhonchi or rales.      Comments: Diminished lung sounds, Increased respiratory effort  Chest:      Chest wall: No tenderness.   Lymphadenopathy:      Cervical: No cervical adenopathy.   Skin:     General: Skin is warm and dry.   Neurological:      Mental Status: He is alert.      Motor: No weakness.      Gait: Gait normal.

## 2023-10-10 NOTE — PATIENT INSTRUCTIONS
Go to emergency room for further evaluation of cough with shortness of breath, chest pain, lightheadedness, seeing spots. Hx 4 heart attacks and right lung cancer, tobacco abuse

## 2023-11-07 ENCOUNTER — TELEPHONE (OUTPATIENT)
Dept: FAMILY MEDICINE | Facility: CLINIC | Age: 63
End: 2023-11-07
Payer: COMMERCIAL

## 2023-11-07 NOTE — TELEPHONE ENCOUNTER
Left detailed message on patient's cell phone [facility] does not have any available appointments until next week.  Advised him to call [facility] tomorrow morning to see if he can be seen by one of the two same day providers there ([phone].)  Maggy PARKER    [facility]

## 2023-11-07 NOTE — TELEPHONE ENCOUNTER
Reason for Call:  Appointment Request    Patient requesting this type of appt:  Hospital/ED Follow-Up     Requested provider: KERRY Israel    Reason patient unable to be scheduled: Not within requested timeframe    When does patient want to be seen/preferred time: Same day    Comments: Pt was in the ER on Sunday 11/5 and needs a follow up ER visit.  Pt is requesting an appointment tomorrow if possible.     Could we send this information to you in [employer] or would you prefer to receive a phone call?:   Patient would prefer a phone call   Okay to leave a detailed message?: Yes at Home number on file [phone] (home)    Call taken on 11/7/2023 at 1:45 PM by Michelle Jacob

## 2023-11-14 ENCOUNTER — PRE VISIT (OUTPATIENT)
Dept: OTOLARYNGOLOGY | Facility: CLINIC | Age: 63
End: 2023-11-14

## 2024-05-23 NOTE — TELEPHONE ENCOUNTER
Called back. I will refax the H&P from 10/16/2020 and lab results to [facility] # [phone].  Samantha Varela TC   no

## 2024-05-25 ENCOUNTER — HEALTH MAINTENANCE LETTER (OUTPATIENT)
Age: 64
End: 2024-05-25

## 2024-07-02 NOTE — LETTER
August 3, 2020      Jerry Wren  [address]  [place] [address]              To Whom it May Concern,      Jerry was evaluated in clinic today. Please excuse Jerry from work for the next 4 weeks due to an exacerbation of his COPD. We expect he will be able to return to work on Monday August 31. Please contact my office with questions or concerns.            Sincerely,        Norberto Rodriguez MD     Patient is s/p left heart catheterization and is wondering when he can resume strenuous activity. Advised patient that it is recommended to wait 4-6 weeks before resuming strenuous activity. He verbalized understanding.

## 2025-02-18 ENCOUNTER — OFFICE VISIT (OUTPATIENT)
Dept: FAMILY MEDICINE | Facility: CLINIC | Age: 65
End: 2025-02-18
Payer: COMMERCIAL

## 2025-02-18 ENCOUNTER — TELEPHONE (OUTPATIENT)
Dept: FAMILY MEDICINE | Facility: CLINIC | Age: 65
End: 2025-02-18

## 2025-02-18 ENCOUNTER — ANCILLARY PROCEDURE (OUTPATIENT)
Dept: GENERAL RADIOLOGY | Facility: CLINIC | Age: 65
End: 2025-02-18
Attending: PHYSICIAN ASSISTANT
Payer: COMMERCIAL

## 2025-02-18 VITALS
DIASTOLIC BLOOD PRESSURE: 86 MMHG | TEMPERATURE: 97 F | WEIGHT: 217 LBS | HEIGHT: 65 IN | SYSTOLIC BLOOD PRESSURE: 146 MMHG | HEART RATE: 68 BPM | RESPIRATION RATE: 20 BRPM | OXYGEN SATURATION: 98 % | BODY MASS INDEX: 36.15 KG/M2

## 2025-02-18 DIAGNOSIS — R05.1 ACUTE COUGH: ICD-10-CM

## 2025-02-18 DIAGNOSIS — J44.9 CHRONIC OBSTRUCTIVE PULMONARY DISEASE, UNSPECIFIED COPD TYPE (H): Primary | ICD-10-CM

## 2025-02-18 DIAGNOSIS — J44.9 CHRONIC OBSTRUCTIVE PULMONARY DISEASE, UNSPECIFIED COPD TYPE (H): ICD-10-CM

## 2025-02-18 DIAGNOSIS — M54.50 ACUTE LEFT-SIDED LOW BACK PAIN WITHOUT SCIATICA: ICD-10-CM

## 2025-02-18 DIAGNOSIS — S39.92XA INJURY OF BACK, INITIAL ENCOUNTER: ICD-10-CM

## 2025-02-18 DIAGNOSIS — S39.92XA INJURY OF BACK, INITIAL ENCOUNTER: Primary | ICD-10-CM

## 2025-02-18 DIAGNOSIS — Z85.118 H/O MALIGNANT NEOPLASM OF LUNG: ICD-10-CM

## 2025-02-18 PROBLEM — I25.810 CORONARY ARTERY DISEASE INVOLVING CORONARY BYPASS GRAFT OF NATIVE HEART WITHOUT ANGINA PECTORIS: Status: ACTIVE | Noted: 2020-12-21

## 2025-02-18 PROCEDURE — 99204 OFFICE O/P NEW MOD 45 MIN: CPT | Performed by: PHYSICIAN ASSISTANT

## 2025-02-18 PROCEDURE — 72100 X-RAY EXAM L-S SPINE 2/3 VWS: CPT | Mod: TC | Performed by: RADIOLOGY

## 2025-02-18 RX ORDER — ALBUTEROL SULFATE 90 UG/1
2 INHALANT RESPIRATORY (INHALATION) EVERY 6 HOURS
Qty: 18 G | Refills: 1 | Status: SHIPPED | OUTPATIENT
Start: 2025-02-18

## 2025-02-18 RX ORDER — NICOTINE 21 MG/24HR
1 PATCH, TRANSDERMAL 24 HOURS TRANSDERMAL EVERY 24 HOURS
Qty: 30 PATCH | Refills: 11 | Status: SHIPPED | OUTPATIENT
Start: 2025-02-18

## 2025-02-18 RX ORDER — MULTIVITAMIN WITH IRON
1 TABLET ORAL DAILY
COMMUNITY

## 2025-02-18 RX ORDER — CYCLOBENZAPRINE HCL 10 MG
10 TABLET ORAL 3 TIMES DAILY PRN
Qty: 30 TABLET | Refills: 0 | Status: SHIPPED | OUTPATIENT
Start: 2025-02-18 | End: 2025-02-20 | Stop reason: ALTCHOICE

## 2025-02-18 ASSESSMENT — PATIENT HEALTH QUESTIONNAIRE - PHQ9
SUM OF ALL RESPONSES TO PHQ QUESTIONS 1-9: 6
SUM OF ALL RESPONSES TO PHQ QUESTIONS 1-9: 6
10. IF YOU CHECKED OFF ANY PROBLEMS, HOW DIFFICULT HAVE THESE PROBLEMS MADE IT FOR YOU TO DO YOUR WORK, TAKE CARE OF THINGS AT HOME, OR GET ALONG WITH OTHER PEOPLE: NOT DIFFICULT AT ALL

## 2025-02-18 ASSESSMENT — PAIN SCALES - GENERAL: PAINLEVEL_OUTOF10: SEVERE PAIN (8)

## 2025-02-18 ASSESSMENT — ENCOUNTER SYMPTOMS: BACK PAIN: 1

## 2025-02-18 NOTE — TELEPHONE ENCOUNTER
Pt calling states he got a notification on MyChart, but he can't get into MyChart to see what it was.     No provider results note on x-ray. Work excuse letter in pt's chart from provider, but pt states provider gave him this during visit. RN states she thinks it might have been notification that the results of the imaging were released to his MyChart, but provider has not reviewed yet. RN provided MyChart Helpdesk number.    Lita Blas RN

## 2025-02-18 NOTE — PROGRESS NOTES
Assessment & Plan     Injury of back, initial encounter  Will get lumbar spine x-rays today considering patient notes history of lumbar fracture however did not find this on chart review.  No acute fracture per my read, radiology read pending.  With his complete weakness today causing his fall we will have him follow-up with spine, if having improvement with ongoing conservative care can cancel this appointment.  Reviewed cyclobenzaprine dosing and potential adverse effects.  Could consider physical therapy as well.  Work note given through the end of the week.  - XR Lumbar Spine 2/3 Views; Future  - Spine  Referral; Future  - cyclobenzaprine (FLEXERIL) 10 MG tablet; Take 1 tablet (10 mg) by mouth 3 times daily as needed for muscle spasms.    Acute left-sided low back pain without sciatica  See above  - XR Lumbar Spine 2/3 Views; Future  - Spine  Referral; Future  - cyclobenzaprine (FLEXERIL) 10 MG tablet; Take 1 tablet (10 mg) by mouth 3 times daily as needed for muscle spasms.    H/O malignant neoplasm of lung  Due for pulmonology follow-up, new referral placed.  CT ordered as well.  - CT Chest w/o Contrast; Future  - Adult Pulmonary Medicine  Referral; Future    Chronic obstructive pulmonary disease, unspecified COPD type (H)  Did refill his Trelegy, may need alternate therapy if not covered.  Refilled albuterol as well.  Refilled nicotine patches, congratulated patient on smoking cessation.  - Fluticasone-Umeclidin-Vilant (TRELEGY ELLIPTA) 100-62.5-25 MCG/ACT oral inhaler; Inhale 1 puff into the lungs daily.  - albuterol (PROAIR HFA/PROVENTIL HFA/VENTOLIN HFA) 108 (90 Base) MCG/ACT inhaler; Inhale 2 puffs into the lungs every 6 hours.  - Adult Pulmonary Medicine  Referral; Future  - nicotine (NICODERM CQ) 21 MG/24HR 24 hr patch; Place 1 patch onto the skin every 24 hours.          Nicotine/Tobacco Cessation  He reports that he has been smoking cigarettes. He has never used  "smokeless tobacco.  Nicotine/Tobacco Cessation Plan  Already quit cigarettes, updated substance use      BMI  Estimated body mass index is 36.11 kg/m  as calculated from the following:    Height as of this encounter: 1.651 m (5' 5\").    Weight as of this encounter: 98.4 kg (217 lb).             Andre Edwards is a 64 year old, presenting for the following health issues:  Back Pain        2/18/2025     8:42 AM   Additional Questions   Roomed by Lori   Accompanied by Self     Back Pain   This is a new problem. The current episode started more than 1 week ago. The problem occurs constantly. The problem has been gradually worsening. The pain is associated with falling. The pain is present in the lumbar spine. The pain is at a severity of 8/10. The symptoms are aggravated by bending, twisting and certain positions. The pain is The same all the time. Stiffness is present All day. He has tried heat, ice, bed rest and NSAIDs for the symptoms. The treatment provided no relief.      Patient presents today with several concerns.  Back pain had been going on for over a month, was not severe but was gradually worsening.  About a week ago he slipped on the ice and fell on his back.  Pain significantly worsened after this.  Feels it to his midline back and left low back.  Notes that he has broken his spine in the past and this felt similar.  Today had an episode where his legs seem to completely give out and he fell down a flight of stairs.  Still feeling some weakness in his legs but not as significant.  No numbness or tingling.  Has some urinary leakage at baseline, no change.  No bowel dysfunction.  Using over-the-counter medications, heat, ice without much relief.    He has not followed up with primary care in some time, knows he is due for physical.  Does follow-up with cardiology.  Previously followed with pulmonology, but lost to follow-up after his pulmonologist moved clinics.    He did quit smoking, uses a " "nicotine patch still and would like refills.  Has been over 100 days without cigarettes.  Still has persistent cough.  Still to use his albuterol inhaler.  Previous prescription for Trelegy, unsure if this was helpful or not.  History of lung cancer.  Last CT of his chest was November 2023, last pulmonology visit was 1/23/2023.                    Objective    BP (!) 146/86   Pulse 68   Temp 97  F (36.1  C) (Temporal)   Resp 20   Ht 1.651 m (5' 5\")   Wt 98.4 kg (217 lb)   SpO2 98%   BMI 36.11 kg/m    Body mass index is 36.11 kg/m .  Physical Exam   GENERAL: alert and no distress  RESP: lungs clear to auscultation - no rales, rhonchi or wheezes  CV: regular rate and rhythm, normal S1 S2, no S3 or S4, no murmur, click or rub, no peripheral edema   MS: Midline tenderness to L1-L3, along left paraspinal tenderness and spasm.  Lumbar range of motion intact but slowed.  Exam limited by pain.  PT pulses 1+ and equal bilaterally.  Gait steady.  SKIN: no suspicious lesions or rashes            Signed Electronically by: Laly Hogue PA-C    Answers submitted by the patient for this visit:  Patient Health Questionnaire (Submitted on 2/18/2025)  If you checked off any problems, how difficult have these problems made it for you to do your work, take care of things at home, or get along with other people?: Not difficult at all  PHQ9 TOTAL SCORE: 6    "

## 2025-02-18 NOTE — TELEPHONE ENCOUNTER
RN spoke with pt and relayed provider message. Pt verbalized understanding, no further questions.     Lita Blas RN  ----- Message from Laly Hogue sent at 2/18/2025 12:38 PM CST -----  Please call patient with results (per his preference).    His xray showed some mild height loss at his T11 and L1 vertebrae. Unable to tell if this is new or not but could reflect mild compression fractures. If his pain improves over the next few days he does not need to see spine for follow up however if it doesn't seem like its improving then he can schedule with them or consider physical therapy.

## 2025-02-18 NOTE — PATIENT INSTRUCTIONS
At [facility], we strive to deliver an exceptional experience to you, every time we see you. If you receive a survey, please let us know what we are doing well and/or what we could improve upon, as we do value your feedback.  If you have MyChart, you can expect to receive results automatically within 24 hours of their completion.  Your provider will send a note interpreting your results as well.   If you do not have MyChart, you should receive your results in about a week by mail.    Your care team:                            Family Medicine Internal Medicine   MD Elliot Sanchez, MD Fariha Wallace, MD Al Irby, MD Tonya Valentin, PAIonaC    Jitendra Byers, MD Pediatrics   Marizol Garcia, MD Raisa Patricia, MD Maggie Whitten, APRN CNP Yanira Garnett APRN CNP   MD Alexandra Dc, MD Viktoria Patiño, CNP     Pollo Tan, CNP Same-Day Provider (No follow-up visits)   CHANDANA Botello, DNP Yulissa Mejia, CHANDANA Albert, FNP, BC MARIAN SimonC     Clinic hours: Monday - Thursday 7 am-6 pm; Fridays 7 am-5 pm.   Urgent care: Monday - Friday 10 am- 8 pm; Saturday and Sunday 9 am-5 pm.    Clinic: [phone]       [facility]: Monday - Thursday 8 am - 7 pm; Friday 8 am - 6 pm  [facility]: [phone]

## 2025-02-18 NOTE — LETTER
February 18, 2025      Jerry Wren  [address]  [place] [address]        To Whom It May Concern:    Mirzastas KARIMI Siena  was seen on 02/18/2025.  Unable to work through 02/22/2025.        Sincerely,        Laly Hogue PA-C    Electronically signed

## 2025-02-18 NOTE — TELEPHONE ENCOUNTER
Patient states he had a visit today with Laly Hogue. He reports that the Trelegy is $400 copay which patient cannot afford.     Pt is wondering about an alternative. He states his spouse mentioned if the generic Advair is an option?      Routing to Laly for further review and advise.         Maggie Mcgee RN    [facility]

## 2025-02-19 ENCOUNTER — PATIENT OUTREACH (OUTPATIENT)
Dept: CARE COORDINATION | Facility: CLINIC | Age: 65
End: 2025-02-19
Payer: COMMERCIAL

## 2025-02-19 ENCOUNTER — TELEPHONE (OUTPATIENT)
Dept: FAMILY MEDICINE | Facility: CLINIC | Age: 65
End: 2025-02-19
Payer: COMMERCIAL

## 2025-02-19 NOTE — LETTER
February 20, 2025      Jerry Wren  [address]  [place] [address]        To Whom It May Concern:    Jerry Wren was seen in our clinic. He may return to work with the following: limited to light duty - lifting no greater than 10 pounds, in effect through 02/28/2025.      Sincerely,    Laly Hogue PA-C          Electronically signed

## 2025-02-19 NOTE — TELEPHONE ENCOUNTER
FYI - Status Update    Who is Calling: patient    Update: Pt states that he needs the CT scan to say STAT as they are unable to schedule for 2 weeks due to order stating routine.  Please advise, Thank you.    Does caller want a call/response back: Yes     Could we send this information to you in [employer] or would you prefer to receive a phone call?:   Patient would prefer a phone call   Okay to leave a detailed message?: Yes at Cell number on file:    Telephone Information:   Mobile [phone]

## 2025-02-20 RX ORDER — PREDNISONE 20 MG/1
40 TABLET ORAL DAILY
Qty: 10 TABLET | Refills: 0 | Status: SHIPPED | OUTPATIENT
Start: 2025-02-20 | End: 2025-02-25

## 2025-02-20 RX ORDER — METHOCARBAMOL 500 MG/1
500 TABLET, FILM COATED ORAL 4 TIMES DAILY PRN
Qty: 40 TABLET | Refills: 0 | Status: SHIPPED | OUTPATIENT
Start: 2025-02-20

## 2025-02-20 NOTE — TELEPHONE ENCOUNTER
Called patient and relayed provider message below. Patient states his difficulty walking is due to pain, not weakness.     Patient is interested in trying short prednisone burst and different muscle relaxer. States he may be interested in narcotic medication if absolutely needed, but wants to try to hold off on this. Hoping the prednisone and muscle relaxer will help.  Appt cancelled for later today per patient request if provider is willing to send additional medications.    Patient states he does not need a call back once meds are sent, unless the med plan has changed/any new updates from provider.      Routing to provider to review/advise      Parvin Cat, RN, BSN  [facility]  [place], [place] and [place]

## 2025-02-20 NOTE — TELEPHONE ENCOUNTER
Pt is now calling stating that he needs a work restriction letter--stating that it is okay for patient to work with weight restrictions. Please advise.     Pt would like this letter available in [place].

## 2025-02-20 NOTE — TELEPHONE ENCOUNTER
"Relayed provider message. Pt verbalized understanding.     Pt states the tingling going down into legs is worse than what it was on Tuesday. Pt  states he has been alternating heat and cold therapy. His current pain is 8/10. Pain is making it a \"struggle to walk.\" Writer confirmed that this is not a new symptom and that he was feeling this way at his visit on 2/18/25.  He denies weakness in his legs or changes in bowel or bladder function.     Pt states he has taken 600 mg of ibuprofen today. He states the muscle relaxants are not taking the pain away. Writer confirmed that the pt has not taken a muscle relaxant today and encouraged him to do so. Writer also confirmed he has the number to contact  spine [facility] and will call to schedule an appointment today if he does not hear from them.     Pt requests an appointment today. Pt scheduled for an appointment at 12:40 pm. Routing to provider as an FYI.     Ruth Santoyo RN on 2/20/2025 at 9:24 AM     "

## 2025-02-20 NOTE — TELEPHONE ENCOUNTER
Sent alternate inhaler, stiolto generic. With COPD when treating with an inhaler with 2 active ingredients, one with long acting dilation and one with to help relax the area are recommended versus advair which contains the long acting dilation but also a steroid.    I did also change his CT to stat.

## 2025-02-20 NOTE — TELEPHONE ENCOUNTER
Notified pt via e-channel that Workability letter has been created and is available through e-channel.

## 2025-02-20 NOTE — TELEPHONE ENCOUNTER
If difficulty walking is due to pain and he hasn't had any new weakness episodes then we can try switching up his routine to see if we can get him better pain control and he doesn't need to come in. If he has additional weakness episodes then the ER would be the next step.    For pain we can certainly try a short prednisone burst to see if the antiinflammatory effect can help resolve his pain faster (this can upset his stomach especially with ibuprofen use, can also cause increased energy, sleep difficulty). We could also try a different muscle relaxer or a short course of as needed narcotic pain medication like hydrocodone (can be addictive, cause constipation, reduce breathing).

## 2025-02-22 ENCOUNTER — ANCILLARY PROCEDURE (OUTPATIENT)
Dept: CT IMAGING | Facility: CLINIC | Age: 65
End: 2025-02-22
Attending: PHYSICIAN ASSISTANT
Payer: COMMERCIAL

## 2025-02-22 DIAGNOSIS — Z85.118 H/O MALIGNANT NEOPLASM OF LUNG: ICD-10-CM

## 2025-02-22 DIAGNOSIS — J44.9 CHRONIC OBSTRUCTIVE PULMONARY DISEASE, UNSPECIFIED COPD TYPE (H): ICD-10-CM

## 2025-02-22 DIAGNOSIS — R05.1 ACUTE COUGH: ICD-10-CM

## 2025-02-22 PROCEDURE — 71250 CT THORAX DX C-: CPT | Mod: GC | Performed by: RADIOLOGY

## 2025-04-06 DIAGNOSIS — J44.9 CHRONIC OBSTRUCTIVE PULMONARY DISEASE, UNSPECIFIED COPD TYPE (H): ICD-10-CM

## 2025-04-06 NOTE — TELEPHONE ENCOUNTER
albuterol (PROAIR HFA/PROVENTIL HFA/VENTOLIN HFA) 108 (90 Base) MCG/ACT inhaler 18 g 1       Pharmacy is asking for Albuterol HFA 90MCG (9GM) AER QTY:18

## 2025-04-07 RX ORDER — ALBUTEROL SULFATE 90 UG/1
2 INHALANT RESPIRATORY (INHALATION) EVERY 6 HOURS
Qty: 18 G | Refills: 1 | Status: SHIPPED | OUTPATIENT
Start: 2025-04-07

## 2025-06-10 ENCOUNTER — NURSE TRIAGE (OUTPATIENT)
Dept: FAMILY MEDICINE | Facility: CLINIC | Age: 65
End: 2025-06-10
Payer: COMMERCIAL

## 2025-06-10 NOTE — TELEPHONE ENCOUNTER
"Pt advised to go to ED now. Pt states he will go to [facility].    Pt describes needlestick injury resulting from reaching into a backpack at work today, which the owner of the backpack and needles last had access to on 6/3/25. Pt states he found a crack pipe at the job site, and states the backpack belonged to a \"known drug addict.\"    Reason for Disposition   SOURCE person is HIGHER RISK (e.g., senior living inmate, injection drug user) AND needlestick within past 72 hours    Answer Assessment - Initial Assessment Questions  1. DEVICE: \"What punctured the skin?\"  (e.g., hollow injection needle, suture needle, knife blade, razor)      Used needle and syringe that still has some liquid in the syringe, from the home of an individual known to use illicit drugs.    2. LOCATION: \"Where is the puncture located?\"  (e.g., finger)      Hand    3. DEPTH: \"How deep do you think the puncture goes?\"  (e.g., superficial)      Caused bleeding    4. ONSET: \"When did the injury occur?\" (e.g., minutes, hours, days)        Today at work    5. HOW OCCURRED: \"How did this happen?\"      Reached into a backpack and there were used needles and syringes inside.    6. SOURCE BODY FLUID: \"What body fluid were you exposed to?\" (e.g., blood, spinal fluid, none)       Exposed to whatever was on the needle, and potentially inside the syringe.    7. SOURCE HIV-HEPATITIS: \"Does the SOURCE person have Hepatitis or HIV?\" (e.g., no; unknown; HIV+, Hepatitis+)      Source person is high risk for HIV and/or Hepatitis due to illicit drug use and is reported as currently incarcerated.    8. HEPATITIS B VACCINE: \"Have you been fully vaccinated for Hepatitis B?\"      Pt believes he has been fully vaccinated against hepatitis B.    9. TETANUS VACCINE: \"Have you been fully vaccinated for tetanus?\" \"When was your last tetanus booster?\"     Per chart review, pt may be overdue for tetanus vaccine booster, unless he received a dose outside of [facility].    Protocols " used: [id number] Wilcox, BRANDYN, RN

## 2025-06-14 ENCOUNTER — HEALTH MAINTENANCE LETTER (OUTPATIENT)
Age: 65
End: 2025-06-14

## 2025-07-05 ENCOUNTER — OFFICE VISIT (OUTPATIENT)
Dept: URGENT CARE | Facility: URGENT CARE | Age: 65
End: 2025-07-05
Payer: COMMERCIAL

## 2025-07-05 ENCOUNTER — NURSE TRIAGE (OUTPATIENT)
Dept: NURSING | Facility: CLINIC | Age: 65
End: 2025-07-05
Payer: COMMERCIAL

## 2025-07-05 VITALS
OXYGEN SATURATION: 98 % | BODY MASS INDEX: 33.66 KG/M2 | RESPIRATION RATE: 16 BRPM | SYSTOLIC BLOOD PRESSURE: 170 MMHG | HEIGHT: 65 IN | WEIGHT: 202 LBS | HEART RATE: 86 BPM | TEMPERATURE: 97.1 F | DIASTOLIC BLOOD PRESSURE: 92 MMHG

## 2025-07-05 DIAGNOSIS — R31.0 GROSS HEMATURIA: Primary | ICD-10-CM

## 2025-07-05 LAB
ALBUMIN UR-MCNC: NEGATIVE MG/DL
APPEARANCE UR: CLEAR
BACTERIA #/AREA URNS HPF: ABNORMAL /HPF
BILIRUB UR QL STRIP: NEGATIVE
COLOR UR AUTO: YELLOW
GLUCOSE UR STRIP-MCNC: NEGATIVE MG/DL
HGB UR QL STRIP: ABNORMAL
KETONES UR STRIP-MCNC: NEGATIVE MG/DL
LEUKOCYTE ESTERASE UR QL STRIP: NEGATIVE
MUCOUS THREADS #/AREA URNS LPF: PRESENT /LPF
NITRATE UR QL: NEGATIVE
PH UR STRIP: 5.5 [PH] (ref 5–7)
RBC #/AREA URNS AUTO: ABNORMAL /HPF
SP GR UR STRIP: 1.02 (ref 1–1.03)
UROBILINOGEN UR STRIP-ACNC: 0.2 E.U./DL
WBC #/AREA URNS AUTO: ABNORMAL /HPF

## 2025-07-05 PROCEDURE — 81001 URINALYSIS AUTO W/SCOPE: CPT

## 2025-07-05 PROCEDURE — 99213 OFFICE O/P EST LOW 20 MIN: CPT

## 2025-07-05 PROCEDURE — 3080F DIAST BP >= 90 MM HG: CPT

## 2025-07-05 PROCEDURE — 3077F SYST BP >= 140 MM HG: CPT

## 2025-07-05 PROCEDURE — 1126F AMNT PAIN NOTED NONE PRSNT: CPT

## 2025-07-05 ASSESSMENT — PAIN SCALES - GENERAL: PAINLEVEL_OUTOF10: NO PAIN (0)

## 2025-07-05 NOTE — TELEPHONE ENCOUNTER
Nurse Triage SBAR    Is this a 2nd Level Triage? NO    Situation: Patient calling.     Background: Blood in urine.     Assessment: Yesterday, pt had blood in urine. Blood is still present in urine today, but not as much as yesterday. No pain.     Protocol Recommended Disposition:   See PCP Within 24 Hours    Recommendation: Go to  within 24 hours. Pt plans on going to [facility] this morning when open.          Does the patient meet one of the following criteria for ADS visit consideration? 16+ years old, with an [facility] PCP     TIP  Providers, please consider if this condition is appropriate for management at one of our Acute and Diagnostic Services sites.     If patient is a good candidate, please use dotphrase <dot>triageresponse and select Refer to ADS to document.    Reason for Disposition   Blood in urine  (Exception: Could be normal menstrual bleeding.)    Additional Information   Negative: Shock suspected (e.g., cold/pale/clammy skin, too weak to stand, low BP, rapid pulse)   Negative: Sounds like a life-threatening emergency to the triager   Negative: Recent back or abdominal injury   Negative: Recent genital injury   Negative: [1] Unable to urinate (or only a few drops) > 4 hours AND [2] bladder feels very full (e.g., palpable bladder or strong urge to urinate)   Negative: [1] Diffuse (all over) muscle pains in the shoulders, arms, legs, and back AND [2] dark (cola or tea-colored) or red-colored urine   Negative: Passing pure blood or large blood clots (i.e., size > a dime)  (Exception: Lisa or small strands.)   Negative: Fever > 100.4 F (38.0 C)   Negative: Patient sounds very sick or weak to the triager   Negative: Known sickle cell disease   Negative: [1] Pain or burning with passing urine AND [2] side (flank) or back pain present   Negative: Taking Coumadin (warfarin) or other strong blood thinner, or known bleeding disorder (e.g., thrombocytopenia)   Negative: Pain or burning with passing urine    Negative: Side (flank) or back pain present   Negative: [1] Pink or red-colored urine and likely from food (beets, rhubarb, red food dye) AND [2] lasts > 24 hours after stopping food    Protocols used: Urine - Blood In-A-AH

## 2025-07-05 NOTE — PROGRESS NOTES
Urgent Care Clinic Visit    Chief Complaint   Patient presents with    Urgent Care    Kidney Problem     Started last night, no pain or burning               7/5/2025     9:13 AM   Additional Questions   Roomed by ca   Accompanied by self     Pre-Provider Visit Orders- Urinalysis UA/UC  Patient reports the following symptoms:  blood in the urine   Does the patient report any of the following symptoms: has a urinary catheter in place, or unable to void in a specimen cup?  No

## 2025-07-05 NOTE — PATIENT INSTRUCTIONS
Your urine sample does not show any evidence of UTI.  The other thing I think about is a kidney stone though typically this would present with pain, if you do experience any abdominal or back pain patient go to the ER for further evaluation.  I would like to get you in with the urologist for further testing and evaluation, referral is placed for you.

## 2025-07-05 NOTE — PROGRESS NOTES
"Patient presents with:  Urgent Care  Kidney Problem: Started last night, no pain or burning       Clinical Decision Making:      ICD-10-CM    1. Gross hematuria  R31.0 UA Macroscopic with reflex to Microscopic and Culture - Lab Collect     UA Macroscopic with reflex to Microscopic and Culture - Lab Collect     UA Microscopic with Reflex to Culture     Adult Urology  Referral        Patient with blood in his urine that he first noticed last night.  He denies any other new symptoms.  No pain with urination, no abdominal pain, no back pain.  Denies any fevers.  Without any pain I would have a lower suspicion for kidney stone though this was something considered and discussed that if he does develop any new abdominal or back pain he should go to the ER for further evaluation.  Urine sample without any indication of UTI.  No lightheadedness or dizziness and urine is more brown-tinged rather than bright red blood.  I do worry about malignancy, especially given some of his history and recommend he follow-up with urology for further testing and evaluation, referral placed. Patient instructions as below. Discussed red flag symptoms for when to return.       Patient Instructions   Your urine sample does not show any evidence of UTI.  The other thing I think about is a kidney stone though typically this would present with pain, if you do experience any abdominal or back pain patient go to the ER for further evaluation.  I would like to get you in with the urologist for further testing and evaluation, referral is placed for you.    HPI:  Jerry Wren is a 65 year old male who presents today with concerns of blood in the urine. Symptoms started last night. Urine is slightly \"brown\" tinged. Not bright red. No lightheadedness or dizziness. No pain with urination. No fevers. He does have urinary frequency and urgency though he has had this for a few years ever since he had surgery for CABG and had to have a " "catheter placed. No new frequencies or urgencies. No abdominal or back pain.     History obtained from the patient.    Problem List:  2020-12: Coronary artery disease involving coronary bypass graft of   native heart without angina pectoris  2018-09: H/O malignant neoplasm of lung  2015-03: COPD (chronic obstructive pulmonary disease) (H)      Past Medical History:   Diagnosis Date    Adenocarcinoma, lung, right (H)     Coronary artery disease involving native coronary artery of native heart without angina pectoris     Dyslipidemia     Essential hypertension        Social History     Tobacco Use    Smoking status: Former     Types: Cigarettes    Smokeless tobacco: Never   Substance Use Topics    Alcohol use: Not Currently     Comment: 31 years ago        ROS is negative other than what is noted in HPI.       Vitals:    07/05/25 0913 07/05/25 0915   BP: (!) 173/106 (!) 170/92   Pulse: 86    Resp: 16    Temp: 97.1  F (36.2  C)    TempSrc: Tympanic    SpO2: 98%    Weight: 91.6 kg (202 lb)    Height: 1.651 m (5' 5\")        Physical Exam  Constitutional:       General: He is not in acute distress.     Appearance: He is not diaphoretic.   HENT:      Head: Normocephalic and atraumatic.      Right Ear: External ear normal.      Left Ear: External ear normal.   Eyes:      Conjunctiva/sclera: Conjunctivae normal.   Cardiovascular:      Rate and Rhythm: Normal rate and regular rhythm.   Pulmonary:      Effort: Pulmonary effort is normal. No respiratory distress.   Abdominal:      Palpations: Abdomen is soft.      Tenderness: There is no abdominal tenderness. There is no right CVA tenderness, left CVA tenderness, guarding or rebound.   Neurological:      Mental Status: He is alert.   Psychiatric:         Mood and Affect: Mood normal.         Thought Content: Thought content normal.         Judgment: Judgment normal.         Results:  Results for orders placed or performed in visit on 07/05/25   UA Macroscopic with reflex to " Microscopic and Culture - Lab Collect     Status: Abnormal    Specimen: Urine, Midstream   Result Value Ref Range    Color Urine Yellow Colorless, Straw, Light Yellow, Yellow    Appearance Urine Clear Clear    Glucose Urine Negative Negative mg/dL    Bilirubin Urine Negative Negative    Ketones Urine Negative Negative mg/dL    Specific Gravity Urine 1.020 1.003 - 1.035    Blood Urine Small (A) Negative    pH Urine 5.5 5.0 - 7.0    Protein Albumin Urine Negative Negative mg/dL    Urobilinogen Urine 0.2 0.2, 1.0 E.U./dL    Nitrite Urine Negative Negative    Leukocyte Esterase Urine Negative Negative   UA Microscopic with Reflex to Culture     Status: Abnormal   Result Value Ref Range    Bacteria Urine Few (A) None Seen /HPF    RBC Urine 2-5 (A) 0-2 /HPF /HPF    WBC Urine 0-5 0-5 /HPF /HPF    Mucus Urine Present (A) None Seen /LPF    Narrative    Urine Culture not indicated         At the end of the encounter, I discussed results, diagnosis, medications. Discussed red flags for immediate return to clinic/ER, as well as indications for follow up if no improvement. Patient understood and agreed to plan. Patient was stable for discharge.    CHANDANA [facility]

## 2025-07-19 NOTE — TELEPHONE ENCOUNTER
MEDICAL RECORDS REQUEST   [facility]  Urology Clinic  [address] [place] [address]  PHONE: [phone]  Fax: [phone]        FUTURE VISIT INFORMATION                                                   Jerry Wren, : 1960 scheduled for future visit at [facility]    APPOINTMENT INFORMATION:  Date: 25  Provider:  Asa Arroyo PA-C   Reason for Visit/Diagnosis: referral for gross hematuria, pt reports increased urinary frequency and urgency. no previous imaging     REFERRAL INFORMATION:  Referring provider:  Tiana Kiser APRN CNP - [facility]     RECORDS REQUESTED FOR VISIT                                                     NOTES  STATUS/DETAILS   OFFICE NOTE from referring provider  Yes - Internal  25: Tiana Kiser APRN CNP [facility]    MEDICATION LIST  Yes   LABS     URINALYSIS (UA)  Yes     PRE-VISIT CHECKLIST      Joint diagnostic appointment coordinated correctly          (ensure right order & amount of time) Yes   RECORD COLLECTION COMPLETE Yes

## 2025-07-23 NOTE — PROGRESS NOTES
Virtual Visit Details    Type of service:  Video Visit   Video Start Time: 3:28 PM  Video End Time:3:44 PM    Originating Location (pt. Location): Home    Distant Location (provider location):  Off-site  Platform used for Video Visit: [facility]    Visit conducted via real-time audio/video technology by Asa Arroyo PA-C to the patient in their home.    Subjective      REFERRING PROVIDER  CHANDANA Merida    REASON FOR VISIT  Gross hematuria    HISTORY OF PRESENT ILLNESS  Mr. Wren is a 65 year old male who I am speaking with today in regards to his recently discovered gross hematuria.  His past medical history is significant for lung cancer, COPD, hypertension, tobacco use, and coronary artery disease.  I personally reviewed the urgent care documentation from 7/5/2025 in preparation for today's visit.    Jeffy presented to the urgent care on the above date due to a urinary episode which revealed brown-tinged urine.  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  Urine testing did not show any evidence of infection but did show microscopic hematuria so was referred to urology for further discussion and evaluation.    Today:  No history of known gross hematuria  Notes that after having a catheter in during his CABG, has seen a marked decrease in his urinary stream   Urinary urgency and urge incontinence has also been an issue over the time since the catheter  Some hematospermia   No history of urinary retention   No history of kidney stones   No pelvic pain   No history of UTIs or prostatitis   No work around strong chemicals consistently     SOCIAL HISTORY  Former smoker, quit in Nov of 2024, 48 years x 2 ppd    FAMILY HISTORY   Denies any known family history of urologic malignancy     REVIEW OF SYSTEMS   Review of Systems   Constitutional:  Negative for fatigue and  unexpected weight change.   HENT:  Negative for hearing loss.    Eyes:  Negative for visual disturbance.   Respiratory:  Negative for shortness of breath.    Cardiovascular:  Negative for chest pain.   Gastrointestinal:  Negative for abdominal pain.   Genitourinary:  Negative for hematuria.   Musculoskeletal:  Positive for back pain (Chronic low back pain, hx of breaking his back).   Neurological:  Negative for dizziness and light-headedness.   Hematological:  Negative for adenopathy.   Psychiatric/Behavioral:  Negative for sleep disturbance.       Objective      PHYSICAL EXAMINATION  Deferred given virtual visit.    LABS   Latest Reference Range & Units 07/05/25 09:13   Color Urine Colorless, Straw, Light Yellow, Yellow  Yellow   Appearance Urine Clear  Clear   Glucose Urine Negative mg/dL Negative   Bilirubin Urine Negative  Negative   Ketones Urine Negative mg/dL Negative   Specific Gravity Urine 1.003 - 1.035  1.020   pH Urine 5.0 - 7.0  5.5   Protein Albumin Urine Negative mg/dL Negative   Urobilinogen Urine 0.2, 1.0 E.U./dL 0.2   Nitrite Urine Negative  Negative   Blood Urine Negative  Small !   Leukocyte Esterase Urine Negative  Negative   WBC Urine 0-5 /HPF /HPF 0-5   RBC Urine 0-2 /HPF /HPF 2-5 !   Bacteria Urine None Seen /HPF Few !   Mucus Urine None Seen /LPF Present !   !: Data is abnormal    Assessment & Plan    Gross hematuria   Obstructive lower urinary tract symptoms after catheterization - stricture?    It was my pleasure to speak with Mr. Wren today regarding his recently discovered gross hematuria. We discussed possible etiologies of gross hematuria including both benign and malignant causes. We discussed the American Urological Association's recommendation for workup of gross and microscopic hematuria (visible blood vs. 3 or more red blood cells on urinalysis) which would include a CT urogram or renal ultrasound depending on risk level, cystoscopy or CX bladder depending on risk level,  and in the case of gross hematuria, a urine cytology. I described these 2-3 tests to Mr. Wren as well as the relative risks and benefits of each.    After reviewing the above information, Mr. Wren expressed understanding and agreement with moving forward with the CT Urogram and cystoscopy.     If the hematuria evaluation is negative, Mr. Wren should undergo a repeat urinalysis in one year. If this repeat urinalysis is also negative for microscopic hematuria, no further workup is needed. If there is persistent microscopic hematuria, we will need to enter into shared decision making regarding repeat evaluation with CT Urogram and cystoscopy vs. Observation.    We did discuss that possibility that the catheterization he had before      PLAN  First available CT urogram and cystoscopy with me  Urine cytology to be collected at time of cystoscopy     SIGNED    Asa Arroyo PA-C      I spent a total of 20 minutes spent on the date of the encounter doing chart review, history and exam, documentation, and further activities as noted above.

## 2025-07-24 ENCOUNTER — PRE VISIT (OUTPATIENT)
Dept: UROLOGY | Facility: CLINIC | Age: 65
End: 2025-07-24

## 2025-07-24 ENCOUNTER — VIRTUAL VISIT (OUTPATIENT)
Dept: UROLOGY | Facility: CLINIC | Age: 65
End: 2025-07-24
Payer: COMMERCIAL

## 2025-07-24 DIAGNOSIS — R31.0 GROSS HEMATURIA: Primary | ICD-10-CM

## 2025-07-24 ASSESSMENT — ENCOUNTER SYMPTOMS
HEMATURIA: 0
LIGHT-HEADEDNESS: 0
ADENOPATHY: 0
FATIGUE: 0
ABDOMINAL PAIN: 0
BACK PAIN: 1
SHORTNESS OF BREATH: 0
SLEEP DISTURBANCE: 0
DIZZINESS: 0
UNEXPECTED WEIGHT CHANGE: 0

## 2025-07-24 NOTE — NURSING NOTE
Current patient location: [place]    Is the patient currently in the state of [place]? YES    Visit mode: VIDEO    If the visit is dropped, the patient can be reconnected by:VIDEO VISIT: Text to cell phone:   Telephone Information:   Mobile [phone]       Will anyone else be joining the visit? NO  (If patient encounters technical issues they should call [phone]    Are changes needed to the allergy or medication list? Pt stated no med changes    Are refills needed on medications prescribed by this physician? NO- new pt     Rooming Documentation:  Not applicable    Reason for visit: Consult    Sparkle BROWNE

## 2025-07-24 NOTE — LETTER
7/24/2025       RE: Jerry Wren  [address]  [facility] [address]     Dear Colleague,    Thank you for referring your patient, Jerry Wren, to the [facility] [place] [facility]. Please see a copy of my visit note below.    Virtual Visit Details    Type of service:  Video Visit   Video Start Time: 3:28 PM  Video End Time:3:44 PM    Originating Location (pt. Location): Home    Distant Location (provider location):  Off-site  Platform used for Video Visit: [facility]    Visit conducted via real-time audio/video technology by Asa Arroyo PA-C to the patient in their home.    Subjective     REFERRING PROVIDER  CHANDANA Merida    REASON FOR VISIT  Gross hematuria    HISTORY OF PRESENT ILLNESS  Mr. Wren is a 65 year old male who I am speaking with today in regards to his recently discovered gross hematuria.  His past medical history is significant for lung cancer, COPD, hypertension, tobacco use, and coronary artery disease.  I personally reviewed the urgent care documentation from 7/5/2025 in preparation for today's visit.    Jeffy presented to the urgent care on the above date due to a urinary episode which revealed brown-tinged urine.  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  Urine testing did not show any evidence of infection but did show microscopic hematuria so was referred to urology for further discussion and evaluation.    Today:  No history of known gross hematuria  Notes that after having a catheter in during his CABG, has seen a marked decrease in his urinary stream   Urinary urgency and urge incontinence has also been an issue over the time since the catheter  Some hematospermia   No history of urinary retention   No history of kidney stones   No pelvic pain    No history of UTIs or prostatitis   No work around strong chemicals consistently     SOCIAL HISTORY  Former smoker, quit in Nov of 2024, 48 years x 2 ppd    FAMILY HISTORY   Denies any known family history of urologic malignancy     REVIEW OF SYSTEMS   Review of Systems   Constitutional:  Negative for fatigue and unexpected weight change.   HENT:  Negative for hearing loss.    Eyes:  Negative for visual disturbance.   Respiratory:  Negative for shortness of breath.    Cardiovascular:  Negative for chest pain.   Gastrointestinal:  Negative for abdominal pain.   Genitourinary:  Negative for hematuria.   Musculoskeletal:  Positive for back pain (Chronic low back pain, hx of breaking his back).   Neurological:  Negative for dizziness and light-headedness.   Hematological:  Negative for adenopathy.   Psychiatric/Behavioral:  Negative for sleep disturbance.       Objective     PHYSICAL EXAMINATION  Deferred given virtual visit.    LABS   Latest Reference Range & Units 07/05/25 09:13   Color Urine Colorless, Straw, Light Yellow, Yellow  Yellow   Appearance Urine Clear  Clear   Glucose Urine Negative mg/dL Negative   Bilirubin Urine Negative  Negative   Ketones Urine Negative mg/dL Negative   Specific Gravity Urine 1.003 - 1.035  1.020   pH Urine 5.0 - 7.0  5.5   Protein Albumin Urine Negative mg/dL Negative   Urobilinogen Urine 0.2, 1.0 E.U./dL 0.2   Nitrite Urine Negative  Negative   Blood Urine Negative  Small !   Leukocyte Esterase Urine Negative  Negative   WBC Urine 0-5 /HPF /HPF 0-5   RBC Urine 0-2 /HPF /HPF 2-5 !   Bacteria Urine None Seen /HPF Few !   Mucus Urine None Seen /LPF Present !   !: Data is abnormal    Assessment & Plan   Gross hematuria   Obstructive lower urinary tract symptoms after catheterization - stricture?    It was my pleasure to speak with Mr. Wren today regarding his recently discovered gross hematuria. We discussed possible etiologies of gross hematuria including both benign and  malignant causes. We discussed the American U[employer] Association's recommendation for workup of gross and microscopic hematuria (visible blood vs. 3 or more red blood cells on urinalysis) which would include a CT urogram or renal ultrasound depending on risk level, cystoscopy or CX bladder depending on risk level, and in the case of gross hematuria, a urine cytology. I described these 2-3 tests to Mr. Wren as well as the relative risks and benefits of each.    After reviewing the above information, Mr. Wren expressed understanding and agreement with moving forward with the CT Urogram and cystoscopy.     If the hematuria evaluation is negative, Mr. Wren should undergo a repeat urinalysis in one year. If this repeat urinalysis is also negative for microscopic hematuria, no further workup is needed. If there is persistent microscopic hematuria, we will need to enter into shared decision making regarding repeat evaluation with CT Urogram and cystoscopy vs. Observation.    We did discuss that possibility that the catheterization he had before      PLAN  First available CT urogram and cystoscopy with me  Urine cytology to be collected at time of cystoscopy     SIGNED    Asa Arroyo PA-C      I spent a total of 20 minutes spent on the date of the encounter doing chart review, history and exam, documentation, and further activities as noted above.    Again, thank you for allowing me to participate in the care of your patient.      Sincerely,    Asa Arroyo PA-C